# Patient Record
Sex: FEMALE | Race: BLACK OR AFRICAN AMERICAN | NOT HISPANIC OR LATINO
[De-identification: names, ages, dates, MRNs, and addresses within clinical notes are randomized per-mention and may not be internally consistent; named-entity substitution may affect disease eponyms.]

---

## 2022-11-19 ENCOUNTER — NON-APPOINTMENT (OUTPATIENT)
Age: 62
End: 2022-11-19

## 2023-11-22 RX ORDER — AMOXICILLIN 250 MG/5ML
1 SUSPENSION, RECONSTITUTED, ORAL (ML) ORAL
Refills: 0 | DISCHARGE
Start: 2023-11-22

## 2023-11-26 ENCOUNTER — INPATIENT (INPATIENT)
Facility: HOSPITAL | Age: 63
LOS: 2 days | Discharge: ANOTHER HOSPITAL | DRG: 303 | End: 2023-11-29
Attending: HOSPITALIST | Admitting: STUDENT IN AN ORGANIZED HEALTH CARE EDUCATION/TRAINING PROGRAM
Payer: COMMERCIAL

## 2023-11-26 VITALS — HEIGHT: 66 IN | WEIGHT: 192.02 LBS

## 2023-11-26 DIAGNOSIS — R07.9 CHEST PAIN, UNSPECIFIED: ICD-10-CM

## 2023-11-26 DIAGNOSIS — D64.9 ANEMIA, UNSPECIFIED: ICD-10-CM

## 2023-11-26 DIAGNOSIS — I10 ESSENTIAL (PRIMARY) HYPERTENSION: ICD-10-CM

## 2023-11-26 DIAGNOSIS — E11.9 TYPE 2 DIABETES MELLITUS WITHOUT COMPLICATIONS: ICD-10-CM

## 2023-11-26 LAB
ALBUMIN SERPL ELPH-MCNC: 3.7 G/DL — SIGNIFICANT CHANGE UP (ref 3.3–5)
ALBUMIN SERPL ELPH-MCNC: 3.7 G/DL — SIGNIFICANT CHANGE UP (ref 3.3–5)
ALP SERPL-CCNC: 83 U/L — SIGNIFICANT CHANGE UP (ref 40–120)
ALP SERPL-CCNC: 83 U/L — SIGNIFICANT CHANGE UP (ref 40–120)
ALT FLD-CCNC: 29 U/L — SIGNIFICANT CHANGE UP (ref 12–78)
ALT FLD-CCNC: 29 U/L — SIGNIFICANT CHANGE UP (ref 12–78)
ANION GAP SERPL CALC-SCNC: 8 MMOL/L — SIGNIFICANT CHANGE UP (ref 5–17)
ANION GAP SERPL CALC-SCNC: 8 MMOL/L — SIGNIFICANT CHANGE UP (ref 5–17)
ANISOCYTOSIS BLD QL: SIGNIFICANT CHANGE UP
ANISOCYTOSIS BLD QL: SIGNIFICANT CHANGE UP
APTT BLD: 29.2 SEC — SIGNIFICANT CHANGE UP (ref 24.5–35.6)
APTT BLD: 29.2 SEC — SIGNIFICANT CHANGE UP (ref 24.5–35.6)
AST SERPL-CCNC: 28 U/L — SIGNIFICANT CHANGE UP (ref 15–37)
AST SERPL-CCNC: 28 U/L — SIGNIFICANT CHANGE UP (ref 15–37)
BASOPHILS # BLD AUTO: 0.06 K/UL — SIGNIFICANT CHANGE UP (ref 0–0.2)
BASOPHILS # BLD AUTO: 0.06 K/UL — SIGNIFICANT CHANGE UP (ref 0–0.2)
BASOPHILS NFR BLD AUTO: 0.6 % — SIGNIFICANT CHANGE UP (ref 0–2)
BASOPHILS NFR BLD AUTO: 0.6 % — SIGNIFICANT CHANGE UP (ref 0–2)
BILIRUB SERPL-MCNC: 0.2 MG/DL — SIGNIFICANT CHANGE UP (ref 0.2–1.2)
BILIRUB SERPL-MCNC: 0.2 MG/DL — SIGNIFICANT CHANGE UP (ref 0.2–1.2)
BUN SERPL-MCNC: 13 MG/DL — SIGNIFICANT CHANGE UP (ref 7–23)
BUN SERPL-MCNC: 13 MG/DL — SIGNIFICANT CHANGE UP (ref 7–23)
CALCIUM SERPL-MCNC: 9.2 MG/DL — SIGNIFICANT CHANGE UP (ref 8.5–10.1)
CALCIUM SERPL-MCNC: 9.2 MG/DL — SIGNIFICANT CHANGE UP (ref 8.5–10.1)
CHLORIDE SERPL-SCNC: 112 MMOL/L — HIGH (ref 96–108)
CHLORIDE SERPL-SCNC: 112 MMOL/L — HIGH (ref 96–108)
CO2 SERPL-SCNC: 22 MMOL/L — SIGNIFICANT CHANGE UP (ref 22–31)
CO2 SERPL-SCNC: 22 MMOL/L — SIGNIFICANT CHANGE UP (ref 22–31)
CREAT SERPL-MCNC: 0.89 MG/DL — SIGNIFICANT CHANGE UP (ref 0.5–1.3)
CREAT SERPL-MCNC: 0.89 MG/DL — SIGNIFICANT CHANGE UP (ref 0.5–1.3)
DACRYOCYTES BLD QL SMEAR: SLIGHT — SIGNIFICANT CHANGE UP
DACRYOCYTES BLD QL SMEAR: SLIGHT — SIGNIFICANT CHANGE UP
EGFR: 72 ML/MIN/1.73M2 — SIGNIFICANT CHANGE UP
EGFR: 72 ML/MIN/1.73M2 — SIGNIFICANT CHANGE UP
ELLIPTOCYTES BLD QL SMEAR: SLIGHT — SIGNIFICANT CHANGE UP
ELLIPTOCYTES BLD QL SMEAR: SLIGHT — SIGNIFICANT CHANGE UP
EOSINOPHIL # BLD AUTO: 0.19 K/UL — SIGNIFICANT CHANGE UP (ref 0–0.5)
EOSINOPHIL # BLD AUTO: 0.19 K/UL — SIGNIFICANT CHANGE UP (ref 0–0.5)
EOSINOPHIL NFR BLD AUTO: 2 % — SIGNIFICANT CHANGE UP (ref 0–6)
EOSINOPHIL NFR BLD AUTO: 2 % — SIGNIFICANT CHANGE UP (ref 0–6)
GLUCOSE BLDC GLUCOMTR-MCNC: 117 MG/DL — HIGH (ref 70–99)
GLUCOSE BLDC GLUCOMTR-MCNC: 117 MG/DL — HIGH (ref 70–99)
GLUCOSE SERPL-MCNC: 90 MG/DL — SIGNIFICANT CHANGE UP (ref 70–99)
GLUCOSE SERPL-MCNC: 90 MG/DL — SIGNIFICANT CHANGE UP (ref 70–99)
HCT VFR BLD CALC: 29.9 % — LOW (ref 34.5–45)
HCT VFR BLD CALC: 29.9 % — LOW (ref 34.5–45)
HGB BLD-MCNC: 9 G/DL — LOW (ref 11.5–15.5)
HGB BLD-MCNC: 9 G/DL — LOW (ref 11.5–15.5)
HYPOCHROMIA BLD QL: SIGNIFICANT CHANGE UP
HYPOCHROMIA BLD QL: SIGNIFICANT CHANGE UP
IMM GRANULOCYTES NFR BLD AUTO: 0.2 % — SIGNIFICANT CHANGE UP (ref 0–0.9)
IMM GRANULOCYTES NFR BLD AUTO: 0.2 % — SIGNIFICANT CHANGE UP (ref 0–0.9)
INR BLD: 1.02 RATIO — SIGNIFICANT CHANGE UP (ref 0.85–1.18)
INR BLD: 1.02 RATIO — SIGNIFICANT CHANGE UP (ref 0.85–1.18)
LYMPHOCYTES # BLD AUTO: 1.59 K/UL — SIGNIFICANT CHANGE UP (ref 1–3.3)
LYMPHOCYTES # BLD AUTO: 1.59 K/UL — SIGNIFICANT CHANGE UP (ref 1–3.3)
LYMPHOCYTES # BLD AUTO: 16.5 % — SIGNIFICANT CHANGE UP (ref 13–44)
LYMPHOCYTES # BLD AUTO: 16.5 % — SIGNIFICANT CHANGE UP (ref 13–44)
MACROCYTES BLD QL: SLIGHT — SIGNIFICANT CHANGE UP
MACROCYTES BLD QL: SLIGHT — SIGNIFICANT CHANGE UP
MANUAL SMEAR VERIFICATION: SIGNIFICANT CHANGE UP
MANUAL SMEAR VERIFICATION: SIGNIFICANT CHANGE UP
MCHC RBC-ENTMCNC: 22.2 PG — LOW (ref 27–34)
MCHC RBC-ENTMCNC: 22.2 PG — LOW (ref 27–34)
MCHC RBC-ENTMCNC: 30.1 GM/DL — LOW (ref 32–36)
MCHC RBC-ENTMCNC: 30.1 GM/DL — LOW (ref 32–36)
MCV RBC AUTO: 73.6 FL — LOW (ref 80–100)
MCV RBC AUTO: 73.6 FL — LOW (ref 80–100)
MICROCYTES BLD QL: SIGNIFICANT CHANGE UP
MICROCYTES BLD QL: SIGNIFICANT CHANGE UP
MONOCYTES # BLD AUTO: 0.9 K/UL — SIGNIFICANT CHANGE UP (ref 0–0.9)
MONOCYTES # BLD AUTO: 0.9 K/UL — SIGNIFICANT CHANGE UP (ref 0–0.9)
MONOCYTES NFR BLD AUTO: 9.3 % — SIGNIFICANT CHANGE UP (ref 2–14)
MONOCYTES NFR BLD AUTO: 9.3 % — SIGNIFICANT CHANGE UP (ref 2–14)
NEUTROPHILS # BLD AUTO: 6.9 K/UL — SIGNIFICANT CHANGE UP (ref 1.8–7.4)
NEUTROPHILS # BLD AUTO: 6.9 K/UL — SIGNIFICANT CHANGE UP (ref 1.8–7.4)
NEUTROPHILS NFR BLD AUTO: 71.4 % — SIGNIFICANT CHANGE UP (ref 43–77)
NEUTROPHILS NFR BLD AUTO: 71.4 % — SIGNIFICANT CHANGE UP (ref 43–77)
PLAT MORPH BLD: NORMAL — SIGNIFICANT CHANGE UP
PLAT MORPH BLD: NORMAL — SIGNIFICANT CHANGE UP
PLATELET # BLD AUTO: 334 K/UL — SIGNIFICANT CHANGE UP (ref 150–400)
PLATELET # BLD AUTO: 334 K/UL — SIGNIFICANT CHANGE UP (ref 150–400)
PLATELET COUNT - ESTIMATE: NORMAL — SIGNIFICANT CHANGE UP
PLATELET COUNT - ESTIMATE: NORMAL — SIGNIFICANT CHANGE UP
POIKILOCYTOSIS BLD QL AUTO: SLIGHT — SIGNIFICANT CHANGE UP
POIKILOCYTOSIS BLD QL AUTO: SLIGHT — SIGNIFICANT CHANGE UP
POTASSIUM SERPL-MCNC: 3.8 MMOL/L — SIGNIFICANT CHANGE UP (ref 3.5–5.3)
POTASSIUM SERPL-MCNC: 3.8 MMOL/L — SIGNIFICANT CHANGE UP (ref 3.5–5.3)
POTASSIUM SERPL-SCNC: 3.8 MMOL/L — SIGNIFICANT CHANGE UP (ref 3.5–5.3)
POTASSIUM SERPL-SCNC: 3.8 MMOL/L — SIGNIFICANT CHANGE UP (ref 3.5–5.3)
PROT SERPL-MCNC: 7.8 GM/DL — SIGNIFICANT CHANGE UP (ref 6–8.3)
PROT SERPL-MCNC: 7.8 GM/DL — SIGNIFICANT CHANGE UP (ref 6–8.3)
PROTHROM AB SERPL-ACNC: 11.5 SEC — SIGNIFICANT CHANGE UP (ref 9.5–13)
PROTHROM AB SERPL-ACNC: 11.5 SEC — SIGNIFICANT CHANGE UP (ref 9.5–13)
RBC # BLD: 4.06 M/UL — SIGNIFICANT CHANGE UP (ref 3.8–5.2)
RBC # BLD: 4.06 M/UL — SIGNIFICANT CHANGE UP (ref 3.8–5.2)
RBC # FLD: 17.2 % — HIGH (ref 10.3–14.5)
RBC # FLD: 17.2 % — HIGH (ref 10.3–14.5)
RBC BLD AUTO: ABNORMAL
RBC BLD AUTO: ABNORMAL
SODIUM SERPL-SCNC: 142 MMOL/L — SIGNIFICANT CHANGE UP (ref 135–145)
SODIUM SERPL-SCNC: 142 MMOL/L — SIGNIFICANT CHANGE UP (ref 135–145)
STOMATOCYTES BLD QL SMEAR: SLIGHT — SIGNIFICANT CHANGE UP
STOMATOCYTES BLD QL SMEAR: SLIGHT — SIGNIFICANT CHANGE UP
TROPONIN I, HIGH SENSITIVITY RESULT: 4.76 NG/L — SIGNIFICANT CHANGE UP
TROPONIN I, HIGH SENSITIVITY RESULT: 4.76 NG/L — SIGNIFICANT CHANGE UP
WBC # BLD: 9.66 K/UL — SIGNIFICANT CHANGE UP (ref 3.8–10.5)
WBC # BLD: 9.66 K/UL — SIGNIFICANT CHANGE UP (ref 3.8–10.5)
WBC # FLD AUTO: 9.66 K/UL — SIGNIFICANT CHANGE UP (ref 3.8–10.5)
WBC # FLD AUTO: 9.66 K/UL — SIGNIFICANT CHANGE UP (ref 3.8–10.5)

## 2023-11-26 PROCEDURE — 93005 ELECTROCARDIOGRAM TRACING: CPT

## 2023-11-26 PROCEDURE — 87635 SARS-COV-2 COVID-19 AMP PRB: CPT

## 2023-11-26 PROCEDURE — 85027 COMPLETE CBC AUTOMATED: CPT

## 2023-11-26 PROCEDURE — 80053 COMPREHEN METABOLIC PANEL: CPT

## 2023-11-26 PROCEDURE — C9113: CPT

## 2023-11-26 PROCEDURE — 82962 GLUCOSE BLOOD TEST: CPT

## 2023-11-26 PROCEDURE — 82272 OCCULT BLD FECES 1-3 TESTS: CPT

## 2023-11-26 PROCEDURE — 88312 SPECIAL STAINS GROUP 1: CPT

## 2023-11-26 PROCEDURE — 36415 COLL VENOUS BLD VENIPUNCTURE: CPT

## 2023-11-26 PROCEDURE — 85025 COMPLETE CBC W/AUTO DIFF WBC: CPT

## 2023-11-26 PROCEDURE — 99285 EMERGENCY DEPT VISIT HI MDM: CPT

## 2023-11-26 PROCEDURE — 82728 ASSAY OF FERRITIN: CPT

## 2023-11-26 PROCEDURE — 93010 ELECTROCARDIOGRAM REPORT: CPT

## 2023-11-26 PROCEDURE — 83540 ASSAY OF IRON: CPT

## 2023-11-26 PROCEDURE — 84484 ASSAY OF TROPONIN QUANT: CPT

## 2023-11-26 PROCEDURE — 82607 VITAMIN B-12: CPT

## 2023-11-26 PROCEDURE — 83036 HEMOGLOBIN GLYCOSYLATED A1C: CPT

## 2023-11-26 PROCEDURE — 83550 IRON BINDING TEST: CPT

## 2023-11-26 PROCEDURE — 80074 ACUTE HEPATITIS PANEL: CPT

## 2023-11-26 PROCEDURE — 71045 X-RAY EXAM CHEST 1 VIEW: CPT | Mod: 26

## 2023-11-26 PROCEDURE — 99223 1ST HOSP IP/OBS HIGH 75: CPT

## 2023-11-26 PROCEDURE — 80048 BASIC METABOLIC PNL TOTAL CA: CPT

## 2023-11-26 PROCEDURE — 82746 ASSAY OF FOLIC ACID SERUM: CPT

## 2023-11-26 PROCEDURE — 80061 LIPID PANEL: CPT

## 2023-11-26 PROCEDURE — 88305 TISSUE EXAM BY PATHOLOGIST: CPT

## 2023-11-26 RX ORDER — ATORVASTATIN CALCIUM 80 MG/1
1 TABLET, FILM COATED ORAL
Refills: 0 | DISCHARGE

## 2023-11-26 RX ORDER — LABETALOL HCL 100 MG
10 TABLET ORAL ONCE
Refills: 0 | Status: COMPLETED | OUTPATIENT
Start: 2023-11-26 | End: 2023-11-26

## 2023-11-26 RX ORDER — ATORVASTATIN CALCIUM 80 MG/1
40 TABLET, FILM COATED ORAL AT BEDTIME
Refills: 0 | Status: DISCONTINUED | OUTPATIENT
Start: 2023-11-26 | End: 2023-11-29

## 2023-11-26 RX ORDER — VALSARTAN 80 MG/1
80 TABLET ORAL DAILY
Refills: 0 | Status: DISCONTINUED | OUTPATIENT
Start: 2023-11-26 | End: 2023-11-29

## 2023-11-26 RX ORDER — ASPIRIN/CALCIUM CARB/MAGNESIUM 324 MG
81 TABLET ORAL DAILY
Refills: 0 | Status: DISCONTINUED | OUTPATIENT
Start: 2023-11-26 | End: 2023-11-29

## 2023-11-26 RX ORDER — PANTOPRAZOLE SODIUM 20 MG/1
40 TABLET, DELAYED RELEASE ORAL
Refills: 0 | Status: DISCONTINUED | OUTPATIENT
Start: 2023-11-26 | End: 2023-11-29

## 2023-11-26 RX ORDER — DEXTROSE 50 % IN WATER 50 %
25 SYRINGE (ML) INTRAVENOUS ONCE
Refills: 0 | Status: DISCONTINUED | OUTPATIENT
Start: 2023-11-26 | End: 2023-11-29

## 2023-11-26 RX ORDER — METOPROLOL TARTRATE 50 MG
1 TABLET ORAL
Refills: 0 | DISCHARGE

## 2023-11-26 RX ORDER — ASPIRIN/CALCIUM CARB/MAGNESIUM 324 MG
324 TABLET ORAL ONCE
Refills: 0 | Status: COMPLETED | OUTPATIENT
Start: 2023-11-26 | End: 2023-11-26

## 2023-11-26 RX ORDER — LANOLIN ALCOHOL/MO/W.PET/CERES
3 CREAM (GRAM) TOPICAL AT BEDTIME
Refills: 0 | Status: DISCONTINUED | OUTPATIENT
Start: 2023-11-26 | End: 2023-11-29

## 2023-11-26 RX ORDER — VALSARTAN 80 MG/1
1 TABLET ORAL
Refills: 0 | DISCHARGE

## 2023-11-26 RX ORDER — AMOXICILLIN 250 MG/5ML
875 SUSPENSION, RECONSTITUTED, ORAL (ML) ORAL
Refills: 0 | Status: DISCONTINUED | OUTPATIENT
Start: 2023-11-26 | End: 2023-11-29

## 2023-11-26 RX ORDER — SODIUM CHLORIDE 9 MG/ML
1000 INJECTION, SOLUTION INTRAVENOUS
Refills: 0 | Status: DISCONTINUED | OUTPATIENT
Start: 2023-11-26 | End: 2023-11-29

## 2023-11-26 RX ORDER — ONDANSETRON 8 MG/1
4 TABLET, FILM COATED ORAL EVERY 8 HOURS
Refills: 0 | Status: DISCONTINUED | OUTPATIENT
Start: 2023-11-26 | End: 2023-11-29

## 2023-11-26 RX ORDER — INSULIN LISPRO 100/ML
VIAL (ML) SUBCUTANEOUS
Refills: 0 | Status: DISCONTINUED | OUTPATIENT
Start: 2023-11-26 | End: 2023-11-29

## 2023-11-26 RX ORDER — METOPROLOL TARTRATE 50 MG
100 TABLET ORAL DAILY
Refills: 0 | Status: DISCONTINUED | OUTPATIENT
Start: 2023-11-26 | End: 2023-11-29

## 2023-11-26 RX ORDER — ACETAMINOPHEN 500 MG
650 TABLET ORAL EVERY 6 HOURS
Refills: 0 | Status: DISCONTINUED | OUTPATIENT
Start: 2023-11-26 | End: 2023-11-29

## 2023-11-26 RX ORDER — DEXTROSE 50 % IN WATER 50 %
15 SYRINGE (ML) INTRAVENOUS ONCE
Refills: 0 | Status: DISCONTINUED | OUTPATIENT
Start: 2023-11-26 | End: 2023-11-29

## 2023-11-26 RX ORDER — LINAGLIPTIN AND METFORMIN HYDROCHLORIDE 2.5; 85 MG/1; MG/1
1 TABLET, FILM COATED ORAL
Refills: 0 | DISCHARGE

## 2023-11-26 RX ORDER — GLUCAGON INJECTION, SOLUTION 0.5 MG/.1ML
1 INJECTION, SOLUTION SUBCUTANEOUS ONCE
Refills: 0 | Status: DISCONTINUED | OUTPATIENT
Start: 2023-11-26 | End: 2023-11-29

## 2023-11-26 RX ORDER — CHOLECALCIFEROL (VITAMIN D3) 125 MCG
1 CAPSULE ORAL
Refills: 0 | DISCHARGE

## 2023-11-26 RX ADMIN — Medication 324 MILLIGRAM(S): at 12:41

## 2023-11-26 RX ADMIN — Medication 875 MILLIGRAM(S): at 19:51

## 2023-11-26 RX ADMIN — Medication 10 MILLIGRAM(S): at 18:58

## 2023-11-26 NOTE — ED PROVIDER NOTE - CLINICAL SUMMARY MEDICAL DECISION MAKING FREE TEXT BOX
ddx includes: ACS vs anemia vs electrolyte abnormality. will get ED CP labs, and occult. anticipate admission for ACS. ddx includes: ACS vs anemia vs electrolyte abnormality.    will get ED CP labs, and occult. anticipate admission for ACS.

## 2023-11-26 NOTE — PATIENT PROFILE ADULT - NSPROMEDSADMININFO_GEN_A_NUR
Patient called and stated she has been having shortness of breath, weight gain and leg swelling. She saw her dermatologist today and was advised to see Cardiology. Office visit scheduled on 09/26/2019.   no concerns

## 2023-11-26 NOTE — ED PROVIDER NOTE - OBJECTIVE STATEMENT
62 y/o female w/PMHx of HTN, HLD, DM type 2, sciatica, and anemia presents to the ED c/o chest pain that started this morning while decorating her home. Pt visited her cardiologist Dr Lim who noticed "blockage to the left side of heart" on outpt CT scan. States that chest pain is associated with neck pressure. Denies any pain in the arm. denies any bleeding. Pain today is different from the abd pain she usually experiences. Did not take any ASA today.

## 2023-11-26 NOTE — H&P ADULT - ASSESSMENT
63 year old female with a PMHx of HTN, HLD, DM2, sciatica, and anemia presents to the ED for chest pain. Endorses she has been having intermittent chest pain for the past 2 weeks, but this morning pain persisted, radiated to her neck (pressure like) and was more severe which prompted ED visit. Patient was seen by her cardiologist (Dr. Lim), who noticed "blockage to the left side of the heart" on CT scan done as outpatient. Upon evaluation, patient was found to be hypertensive -200s, Endorses pain has improved drastically, denies palpitation SOB, headache, dizziness or any other acute symptoms. In the ED labs remarkable for H/H 9.0/29.9. Troponin (-), ECG : Sinus tachy. No ST changes. Cardio consulted in the ED. Due to Blockage seen in the CT, patient will be taken to the Cath lab,

## 2023-11-26 NOTE — H&P ADULT - NSHPPHYSICALEXAM_GEN_ALL_CORE
GENERAL: NAD, comfortable at bedside   HEAD:  Atraumatic, Normocephalic  EYES: EOMI, PERRL, conjunctiva and sclera clear  NECK: Supple, No JVD  CHEST/LUNG: Clear to auscultation bilaterally; No wheezes, rales or rhonchi  HEART: Regular rate and rhythm; No murmurs, rubs, or gallops, (+)S1, S2  ABDOMEN: Soft, Nontender, Nondistended; Normal Bowel sounds   EXTREMITIES:  2+ Peripheral Pulses, No clubbing, cyanosis, or edema  PSYCH: normal mood and affect  NEUROLOGY: AAOx3, non-focal  SKIN: No rashes or lesions

## 2023-11-26 NOTE — ED ADULT NURSE REASSESSMENT NOTE - NS ED NURSE REASSESS COMMENT FT1
Pt received A&Ox4 VSS-BP improved. Pt denies chest pain/sob and is resting comfortably in room with family at bedside. Pt remains NPO-for possible procedure today? vs tomorrow. Plan of care reviewed, orders pending. Bed assignment pending. Call bell in reach.

## 2023-11-26 NOTE — H&P ADULT - NSHPLABSRESULTS_GEN_ALL_CORE
9.0    9.66  )-----------( 334      ( 26 Nov 2023 12:10 )             29.9     142  |  112<H>  |  13  ----------------------------<  90     11-26  3.8   |  22  |  0.89    Ca    9.2      26 Nov 2023 12:10    TPro  7.8  /  Alb  3.7  /  TBili  0.2  /  DBili  x   /  AST  28  /  ALT  29  /  AlkPhos  83  11-26    PT/INR: 11.5/1.02 (11-26-23 @ 12:10)  PTT: 29.2 (11-26-23 @ 12:10)

## 2023-11-26 NOTE — ED PROVIDER NOTE - ATTENDING APP SHARED VISIT CONTRIBUTION OF CARE
Dr. Paez: I personally saw the patient with the ACP and performed a substantive portion of the visit. I performed a face to face bedside interview with patient regarding history of present illness, review of symptoms and past medical history. I completed an independent physical exam and all aspects of medical decision making. I have discussed patient's plan of care with ACP. I agree with note as stated above, having amended the EMR as needed to reflect my findings. This includes HISTORY OF PRESENT ILLNESS, HIV, PAST MEDICAL/SURGICAL/FAMILY/SOCIAL HISTORY, ALLERGIES AND HOME MEDICATIONS, REVIEW OF SYSTEMS, PHYSICAL EXAM, MEDICAL DECISION MAKING and any PROGRESS NOTES during the time I functioned as the attending physician for this patient.

## 2023-11-26 NOTE — H&P ADULT - PROBLEM SELECTOR PLAN 1
2 weeks history of intermittent chest pain, worsen today   Outpatient CT from 11/22/23: There is CT evidence of coronary atherosclerosis with severe (>70%) stenosis of the proximal LAD due to calcified plaque. There is borderline severe stenosis (50-70%) of a large sized ramus due to calcified plaque.  Troponin (-)   - Tele   - ASA   - Statin   - NPO  - PPI   - DVT prophylaxis  - Cardio consulted Dr. Palla --> to be taken to the Cath Lab

## 2023-11-26 NOTE — ED ADULT TRIAGE NOTE - CHIEF COMPLAINT QUOTE
pt presents to ED c/o chest pain that started this morning while decorating her house. pt reports seeing cardiologist and having test last week which showed "blockage to left side of heart". endorses SOB with walking. EKG in progress

## 2023-11-26 NOTE — ED ADULT NURSE REASSESSMENT NOTE - NS ED NURSE REASSESS COMMENT FT1
RN spoke with MD Palla concerning possible cath tonight vs tomorrow-per Dr. Palla plan is for tomorrow. Pt given food and water and hospitalist made aware. Pt is ready for transfer to floor. Bed assignment pending.

## 2023-11-26 NOTE — H&P ADULT - PROBLEM SELECTOR PLAN 2
Hypertensive in the ED - -200s   Received Labetalol 10mg IV  Continue home meds   - Valsartan 80mg   - Metoprolol Succ 100mg   - Monitor BP

## 2023-11-27 LAB
A1C WITH ESTIMATED AVERAGE GLUCOSE RESULT: 6.7 % — HIGH (ref 4–5.6)
A1C WITH ESTIMATED AVERAGE GLUCOSE RESULT: 6.7 % — HIGH (ref 4–5.6)
ALBUMIN SERPL ELPH-MCNC: 3.3 G/DL — SIGNIFICANT CHANGE UP (ref 3.3–5)
ALBUMIN SERPL ELPH-MCNC: 3.3 G/DL — SIGNIFICANT CHANGE UP (ref 3.3–5)
ALP SERPL-CCNC: 61 U/L — SIGNIFICANT CHANGE UP (ref 40–120)
ALP SERPL-CCNC: 61 U/L — SIGNIFICANT CHANGE UP (ref 40–120)
ALT FLD-CCNC: 26 U/L — SIGNIFICANT CHANGE UP (ref 12–78)
ALT FLD-CCNC: 26 U/L — SIGNIFICANT CHANGE UP (ref 12–78)
ANION GAP SERPL CALC-SCNC: 8 MMOL/L — SIGNIFICANT CHANGE UP (ref 5–17)
ANION GAP SERPL CALC-SCNC: 8 MMOL/L — SIGNIFICANT CHANGE UP (ref 5–17)
AST SERPL-CCNC: 22 U/L — SIGNIFICANT CHANGE UP (ref 15–37)
AST SERPL-CCNC: 22 U/L — SIGNIFICANT CHANGE UP (ref 15–37)
BILIRUB SERPL-MCNC: 0.3 MG/DL — SIGNIFICANT CHANGE UP (ref 0.2–1.2)
BILIRUB SERPL-MCNC: 0.3 MG/DL — SIGNIFICANT CHANGE UP (ref 0.2–1.2)
BUN SERPL-MCNC: 15 MG/DL — SIGNIFICANT CHANGE UP (ref 7–23)
BUN SERPL-MCNC: 15 MG/DL — SIGNIFICANT CHANGE UP (ref 7–23)
CALCIUM SERPL-MCNC: 8.6 MG/DL — SIGNIFICANT CHANGE UP (ref 8.5–10.1)
CALCIUM SERPL-MCNC: 8.6 MG/DL — SIGNIFICANT CHANGE UP (ref 8.5–10.1)
CHLORIDE SERPL-SCNC: 112 MMOL/L — HIGH (ref 96–108)
CHLORIDE SERPL-SCNC: 112 MMOL/L — HIGH (ref 96–108)
CHOLEST SERPL-MCNC: 130 MG/DL — SIGNIFICANT CHANGE UP
CHOLEST SERPL-MCNC: 130 MG/DL — SIGNIFICANT CHANGE UP
CO2 SERPL-SCNC: 23 MMOL/L — SIGNIFICANT CHANGE UP (ref 22–31)
CO2 SERPL-SCNC: 23 MMOL/L — SIGNIFICANT CHANGE UP (ref 22–31)
CREAT SERPL-MCNC: 0.78 MG/DL — SIGNIFICANT CHANGE UP (ref 0.5–1.3)
CREAT SERPL-MCNC: 0.78 MG/DL — SIGNIFICANT CHANGE UP (ref 0.5–1.3)
EGFR: 85 ML/MIN/1.73M2 — SIGNIFICANT CHANGE UP
EGFR: 85 ML/MIN/1.73M2 — SIGNIFICANT CHANGE UP
ESTIMATED AVERAGE GLUCOSE: 146 MG/DL — HIGH (ref 68–114)
ESTIMATED AVERAGE GLUCOSE: 146 MG/DL — HIGH (ref 68–114)
FERRITIN SERPL-MCNC: 8 NG/ML — LOW (ref 13–330)
FERRITIN SERPL-MCNC: 8 NG/ML — LOW (ref 13–330)
GLUCOSE BLDC GLUCOMTR-MCNC: 133 MG/DL — HIGH (ref 70–99)
GLUCOSE BLDC GLUCOMTR-MCNC: 133 MG/DL — HIGH (ref 70–99)
GLUCOSE BLDC GLUCOMTR-MCNC: 141 MG/DL — HIGH (ref 70–99)
GLUCOSE BLDC GLUCOMTR-MCNC: 141 MG/DL — HIGH (ref 70–99)
GLUCOSE BLDC GLUCOMTR-MCNC: 173 MG/DL — HIGH (ref 70–99)
GLUCOSE BLDC GLUCOMTR-MCNC: 173 MG/DL — HIGH (ref 70–99)
GLUCOSE SERPL-MCNC: 130 MG/DL — HIGH (ref 70–99)
GLUCOSE SERPL-MCNC: 130 MG/DL — HIGH (ref 70–99)
HAV IGM SER-ACNC: SIGNIFICANT CHANGE UP
HAV IGM SER-ACNC: SIGNIFICANT CHANGE UP
HBV CORE IGM SER-ACNC: SIGNIFICANT CHANGE UP
HBV CORE IGM SER-ACNC: SIGNIFICANT CHANGE UP
HBV SURFACE AG SER-ACNC: SIGNIFICANT CHANGE UP
HBV SURFACE AG SER-ACNC: SIGNIFICANT CHANGE UP
HCT VFR BLD CALC: 25.7 % — LOW (ref 34.5–45)
HCT VFR BLD CALC: 25.7 % — LOW (ref 34.5–45)
HCT VFR BLD CALC: 27.1 % — LOW (ref 34.5–45)
HCT VFR BLD CALC: 27.1 % — LOW (ref 34.5–45)
HCV AB S/CO SERPL IA: 0.07 S/CO — SIGNIFICANT CHANGE UP (ref 0–0.99)
HCV AB S/CO SERPL IA: 0.07 S/CO — SIGNIFICANT CHANGE UP (ref 0–0.99)
HCV AB SERPL-IMP: SIGNIFICANT CHANGE UP
HCV AB SERPL-IMP: SIGNIFICANT CHANGE UP
HDLC SERPL-MCNC: 38 MG/DL — LOW
HDLC SERPL-MCNC: 38 MG/DL — LOW
HGB BLD-MCNC: 7.8 G/DL — LOW (ref 11.5–15.5)
HGB BLD-MCNC: 7.8 G/DL — LOW (ref 11.5–15.5)
HGB BLD-MCNC: 8.1 G/DL — LOW (ref 11.5–15.5)
HGB BLD-MCNC: 8.1 G/DL — LOW (ref 11.5–15.5)
IRON SATN MFR SERPL: 17 UG/DL — LOW (ref 30–160)
IRON SATN MFR SERPL: 17 UG/DL — LOW (ref 30–160)
IRON SATN MFR SERPL: 5 % — LOW (ref 14–50)
IRON SATN MFR SERPL: 5 % — LOW (ref 14–50)
LIPID PNL WITH DIRECT LDL SERPL: 71 MG/DL — SIGNIFICANT CHANGE UP
LIPID PNL WITH DIRECT LDL SERPL: 71 MG/DL — SIGNIFICANT CHANGE UP
MCHC RBC-ENTMCNC: 21.8 PG — LOW (ref 27–34)
MCHC RBC-ENTMCNC: 21.8 PG — LOW (ref 27–34)
MCHC RBC-ENTMCNC: 22.3 PG — LOW (ref 27–34)
MCHC RBC-ENTMCNC: 22.3 PG — LOW (ref 27–34)
MCHC RBC-ENTMCNC: 29.9 GM/DL — LOW (ref 32–36)
MCHC RBC-ENTMCNC: 29.9 GM/DL — LOW (ref 32–36)
MCHC RBC-ENTMCNC: 30.4 GM/DL — LOW (ref 32–36)
MCHC RBC-ENTMCNC: 30.4 GM/DL — LOW (ref 32–36)
MCV RBC AUTO: 73 FL — LOW (ref 80–100)
MCV RBC AUTO: 73 FL — LOW (ref 80–100)
MCV RBC AUTO: 73.4 FL — LOW (ref 80–100)
MCV RBC AUTO: 73.4 FL — LOW (ref 80–100)
NON HDL CHOLESTEROL: 92 MG/DL — SIGNIFICANT CHANGE UP
NON HDL CHOLESTEROL: 92 MG/DL — SIGNIFICANT CHANGE UP
OB PNL STL: NEGATIVE — SIGNIFICANT CHANGE UP
OB PNL STL: NEGATIVE — SIGNIFICANT CHANGE UP
PLATELET # BLD AUTO: 292 K/UL — SIGNIFICANT CHANGE UP (ref 150–400)
PLATELET # BLD AUTO: 292 K/UL — SIGNIFICANT CHANGE UP (ref 150–400)
PLATELET # BLD AUTO: 309 K/UL — SIGNIFICANT CHANGE UP (ref 150–400)
PLATELET # BLD AUTO: 309 K/UL — SIGNIFICANT CHANGE UP (ref 150–400)
POTASSIUM SERPL-MCNC: 4 MMOL/L — SIGNIFICANT CHANGE UP (ref 3.5–5.3)
POTASSIUM SERPL-MCNC: 4 MMOL/L — SIGNIFICANT CHANGE UP (ref 3.5–5.3)
POTASSIUM SERPL-SCNC: 4 MMOL/L — SIGNIFICANT CHANGE UP (ref 3.5–5.3)
POTASSIUM SERPL-SCNC: 4 MMOL/L — SIGNIFICANT CHANGE UP (ref 3.5–5.3)
PROT SERPL-MCNC: 7 GM/DL — SIGNIFICANT CHANGE UP (ref 6–8.3)
PROT SERPL-MCNC: 7 GM/DL — SIGNIFICANT CHANGE UP (ref 6–8.3)
RBC # BLD: 3.5 M/UL — LOW (ref 3.8–5.2)
RBC # BLD: 3.5 M/UL — LOW (ref 3.8–5.2)
RBC # BLD: 3.71 M/UL — LOW (ref 3.8–5.2)
RBC # BLD: 3.71 M/UL — LOW (ref 3.8–5.2)
RBC # FLD: 17 % — HIGH (ref 10.3–14.5)
RBC # FLD: 17 % — HIGH (ref 10.3–14.5)
RBC # FLD: 17.2 % — HIGH (ref 10.3–14.5)
RBC # FLD: 17.2 % — HIGH (ref 10.3–14.5)
SODIUM SERPL-SCNC: 143 MMOL/L — SIGNIFICANT CHANGE UP (ref 135–145)
SODIUM SERPL-SCNC: 143 MMOL/L — SIGNIFICANT CHANGE UP (ref 135–145)
TIBC SERPL-MCNC: 341 UG/DL — SIGNIFICANT CHANGE UP (ref 220–430)
TIBC SERPL-MCNC: 341 UG/DL — SIGNIFICANT CHANGE UP (ref 220–430)
TRIGL SERPL-MCNC: 116 MG/DL — SIGNIFICANT CHANGE UP
TRIGL SERPL-MCNC: 116 MG/DL — SIGNIFICANT CHANGE UP
TROPONIN I, HIGH SENSITIVITY RESULT: 3.92 NG/L — SIGNIFICANT CHANGE UP
TROPONIN I, HIGH SENSITIVITY RESULT: 3.92 NG/L — SIGNIFICANT CHANGE UP
UIBC SERPL-MCNC: 324 UG/DL — SIGNIFICANT CHANGE UP (ref 110–370)
UIBC SERPL-MCNC: 324 UG/DL — SIGNIFICANT CHANGE UP (ref 110–370)
WBC # BLD: 7.66 K/UL — SIGNIFICANT CHANGE UP (ref 3.8–10.5)
WBC # BLD: 7.66 K/UL — SIGNIFICANT CHANGE UP (ref 3.8–10.5)
WBC # BLD: 8.01 K/UL — SIGNIFICANT CHANGE UP (ref 3.8–10.5)
WBC # BLD: 8.01 K/UL — SIGNIFICANT CHANGE UP (ref 3.8–10.5)
WBC # FLD AUTO: 7.66 K/UL — SIGNIFICANT CHANGE UP (ref 3.8–10.5)
WBC # FLD AUTO: 7.66 K/UL — SIGNIFICANT CHANGE UP (ref 3.8–10.5)
WBC # FLD AUTO: 8.01 K/UL — SIGNIFICANT CHANGE UP (ref 3.8–10.5)
WBC # FLD AUTO: 8.01 K/UL — SIGNIFICANT CHANGE UP (ref 3.8–10.5)

## 2023-11-27 PROCEDURE — 93010 ELECTROCARDIOGRAM REPORT: CPT

## 2023-11-27 PROCEDURE — 99232 SBSQ HOSP IP/OBS MODERATE 35: CPT

## 2023-11-27 RX ORDER — IRON SUCROSE 20 MG/ML
200 INJECTION, SOLUTION INTRAVENOUS ONCE
Refills: 0 | Status: COMPLETED | OUTPATIENT
Start: 2023-11-27 | End: 2023-11-27

## 2023-11-27 RX ORDER — FOLIC ACID 0.8 MG
1 TABLET ORAL ONCE
Refills: 0 | Status: COMPLETED | OUTPATIENT
Start: 2023-11-27 | End: 2023-11-27

## 2023-11-27 RX ORDER — PREGABALIN 225 MG/1
1000 CAPSULE ORAL ONCE
Refills: 0 | Status: COMPLETED | OUTPATIENT
Start: 2023-11-27 | End: 2023-11-27

## 2023-11-27 RX ORDER — IRON SUCROSE 20 MG/ML
200 INJECTION, SOLUTION INTRAVENOUS ONCE
Refills: 0 | Status: DISCONTINUED | OUTPATIENT
Start: 2023-11-27 | End: 2023-11-27

## 2023-11-27 RX ADMIN — Medication 100 MILLIGRAM(S): at 11:12

## 2023-11-27 RX ADMIN — PREGABALIN 1000 MICROGRAM(S): 225 CAPSULE ORAL at 16:41

## 2023-11-27 RX ADMIN — Medication 81 MILLIGRAM(S): at 11:13

## 2023-11-27 RX ADMIN — Medication 875 MILLIGRAM(S): at 11:13

## 2023-11-27 RX ADMIN — Medication 2: at 17:10

## 2023-11-27 RX ADMIN — Medication 1 MILLIGRAM(S): at 16:41

## 2023-11-27 RX ADMIN — IRON SUCROSE 110 MILLIGRAM(S): 20 INJECTION, SOLUTION INTRAVENOUS at 16:40

## 2023-11-27 RX ADMIN — ATORVASTATIN CALCIUM 40 MILLIGRAM(S): 80 TABLET, FILM COATED ORAL at 22:24

## 2023-11-27 RX ADMIN — PANTOPRAZOLE SODIUM 40 MILLIGRAM(S): 20 TABLET, DELAYED RELEASE ORAL at 08:29

## 2023-11-27 RX ADMIN — Medication 3 MILLIGRAM(S): at 22:24

## 2023-11-27 RX ADMIN — VALSARTAN 80 MILLIGRAM(S): 80 TABLET ORAL at 14:30

## 2023-11-27 RX ADMIN — Medication 650 MILLIGRAM(S): at 16:05

## 2023-11-27 RX ADMIN — Medication 875 MILLIGRAM(S): at 22:24

## 2023-11-27 NOTE — CONSULT NOTE ADULT - SUBJECTIVE AND OBJECTIVE BOX
62 yo male with c/o chest pain, angina..  Recent out pt CCTA showed > 70% proximal LAD calcified stenosis.   Pain was severe yesterday, radiated to neck.  Had severely elevated BP on arrival to the ER.  Pain improved with BP control.   Also noted to have severe anemia recently and had GI evaluation with pending endoscopies.       PAST MEDICAL & SURGICAL HISTORY:  Hypertension  Diabetes mellitus  Hyperlipidemia  Anemia - recently diagnosed.  Iron deficiency      PREVIOUS CARDIAC WORKUP:      Echo:  11/9/23  --LV ejection fraction (60 %) is normal.  --The left ventricular filling pattern is consistent with abnormal relaxation.  --The global LV longitudinal strain using the speckle tracking technology is -18.4 %.  --There is trace mitral regurgitation.  --There is mild tricuspid regurgitation.  --There is trace pulmonic regurgitation.  --The right atrial pressure is normal (0 - 5 mm Hg). There is no pulmonary hypertension.  --There is no pericardial effusion.      ALLERGIES:    No Known Allergies       MEDICATIONS  (STANDING):  amoxicillin 875 milliGRAM(s) Oral two times a day  aspirin  chewable 81 milliGRAM(s) Oral daily  atorvastatin 40 milliGRAM(s) Oral at bedtime  dextrose 5%. 1000 milliLiter(s) (50 mL/Hr) IV Continuous <Continuous>  dextrose 50% Injectable 25 Gram(s) IV Push once  glucagon  Injectable 1 milliGRAM(s) IntraMuscular once  insulin lispro (ADMELOG) corrective regimen sliding scale   SubCutaneous three times a day before meals  metoprolol succinate  milliGRAM(s) Oral daily  pantoprazole  Injectable 40 milliGRAM(s) IV Push with breakfast  valsartan 80 milliGRAM(s) Oral daily    MEDICATIONS  (PRN):  acetaminophen     Tablet .. 650 milliGRAM(s) Oral every 6 hours PRN Temp greater or equal to 38C (100.4F), Mild Pain (1 - 3)  aluminum hydroxide/magnesium hydroxide/simethicone Suspension 30 milliLiter(s) Oral every 4 hours PRN Dyspepsia  dextrose Oral Gel 15 Gram(s) Oral once PRN Blood Glucose LESS THAN 70 milliGRAM(s)/deciliter  melatonin 3 milliGRAM(s) Oral at bedtime PRN Insomnia  ondansetron Injectable 4 milliGRAM(s) IV Push every 8 hours PRN Nausea and/or Vomiting      FAMILY HISTORY:  NC        SOCIAL HISTORY:  Nonsmoker. No ETOH abuse. No illicit drugs.     ROS:     Detailed ten system ROS was performed and was negative except for history as eluded to above.    no fever  no chills  no nausea  no vomiting  no diarrhea  no constipation  no melena  no hematochezia  + chest pain  no palpitations  no sob at rest  + dyspnea on exertion  no cough  no wheezing  no anorexia  no headache  no dizziness  no syncope  no weakness  no myalgia  no dysuria  no polyuria  no hematuria       Vital Signs Last 24 Hrs  T(C): 36.9 (27 Nov 2023 08:01), Max: 37.1 (26 Nov 2023 17:37)  T(F): 98.4 (27 Nov 2023 08:01), Max: 98.7 (26 Nov 2023 17:37)  HR: 89 (27 Nov 2023 08:01) (82 - 102)  BP: 141/77 (27 Nov 2023 08:01) (139/71 - 187/88)  BP(mean): 118 (26 Nov 2023 18:55) (94 - 118)  RR: 18 (27 Nov 2023 08:01) (18 - 19)  SpO2: 95% (27 Nov 2023 08:01) (95% - 100%)    Parameters below as of 27 Nov 2023 08:01  Patient On (Oxygen Delivery Method): room air      PHYSICAL EXAM:    General:                Comfortable, AAO X 3, in no distress.  HEENT:                  Unremarkable. Pallor   Neck:                     Supple, no adenopathy, no thyromegaly, no JVD, no bruit.  Chest:                    Clear, B/L symmetric air entry, no tachypnea  Heart:                     S1, S2 normal, no gallop, no murmur.  Abdomen:              Soft, non-tender, bowel sounds active. No palpable masses.  Extremities:           no cyanosis, no edema.   Neurologic:            Grossly nonfocal.       TELEMETRY:    Normal sinus rhythm with no tachy or digna events     ECG:  NSR, normal    LABS:                          7.8    8.01  )-----------( 292      ( 27 Nov 2023 06:15 )             25.7     Hemoglobin: 9.0 g/dL (11.26.23 @ 12:10)     11-27    143  |  112<H>  |  15  ----------------------------<  130<H>  4.0   |  23  |  0.78    Ca    8.6      27 Nov 2023 06:15    TPro  7.0  /  Alb  3.3  /  TBili  0.3  /  DBili  x   /  AST  22  /  ALT  26  /  AlkPhos  61  11-27 11-27 @ 06:15  Trop-I  3.92    11-26 @ 12:10  Trop-I  4.76      PT/INR - ( 26 Nov 2023 12:10 )   PT: 11.5 sec;   INR: 1.02 ratio    PTT - ( 26 Nov 2023 12:10 )  PTT:29.2 sec      RADIOLOGY & ADDITIONAL STUDIES:    < from: Xray Chest 1 View- PORTABLE-Urgent (11.26.23 @ 12:12) >  IMPRESSION:  No active parenchymal disease in the chest.

## 2023-11-27 NOTE — CONSULT NOTE ADULT - ASSESSMENT
CAD with Unstable angina.  In setting of uncontrolled HTN and severe anemia  DM  HLD    Suggest:    Medical optimization of CAD first   Optimize BP  Cardiac monitor  O2 supplement  Follow up Cardiac enzymes  Ensure no active GI bleed. Then treat with aspirin, Plavix  Maximize Beta blockers  Increase ARB dose if Bp remains high.   High dose statins  Nitrates PRN  Ischemia evaluation with cardiac cath once anemia etiology is clear and stabilized. Patient will need high dose anticoagulation during and after cardiac cath.  Will need clearance form Gi to start anticoagulation.   D/W Dr Cook hospitalist

## 2023-11-27 NOTE — PROGRESS NOTE ADULT - NSPROGADDITIONALINFOA_GEN_ALL_CORE
d/w cards  needs cath  at this time no active bleeding  patient consents to blood if needed however hb is 8  will give venofer, folate and b12   GI consulted  prbc on standby if hb drops tomorrow

## 2023-11-28 LAB
BASOPHILS # BLD AUTO: 0.06 K/UL — SIGNIFICANT CHANGE UP (ref 0–0.2)
BASOPHILS # BLD AUTO: 0.06 K/UL — SIGNIFICANT CHANGE UP (ref 0–0.2)
BASOPHILS NFR BLD AUTO: 0.8 % — SIGNIFICANT CHANGE UP (ref 0–2)
BASOPHILS NFR BLD AUTO: 0.8 % — SIGNIFICANT CHANGE UP (ref 0–2)
EOSINOPHIL # BLD AUTO: 0.34 K/UL — SIGNIFICANT CHANGE UP (ref 0–0.5)
EOSINOPHIL # BLD AUTO: 0.34 K/UL — SIGNIFICANT CHANGE UP (ref 0–0.5)
EOSINOPHIL NFR BLD AUTO: 4.7 % — SIGNIFICANT CHANGE UP (ref 0–6)
EOSINOPHIL NFR BLD AUTO: 4.7 % — SIGNIFICANT CHANGE UP (ref 0–6)
FOLATE SERPL-MCNC: 8.6 NG/ML — SIGNIFICANT CHANGE UP
FOLATE SERPL-MCNC: 8.6 NG/ML — SIGNIFICANT CHANGE UP
GLUCOSE BLDC GLUCOMTR-MCNC: 158 MG/DL — HIGH (ref 70–99)
GLUCOSE BLDC GLUCOMTR-MCNC: 158 MG/DL — HIGH (ref 70–99)
GLUCOSE BLDC GLUCOMTR-MCNC: 166 MG/DL — HIGH (ref 70–99)
GLUCOSE BLDC GLUCOMTR-MCNC: 166 MG/DL — HIGH (ref 70–99)
GLUCOSE BLDC GLUCOMTR-MCNC: 187 MG/DL — HIGH (ref 70–99)
GLUCOSE BLDC GLUCOMTR-MCNC: 187 MG/DL — HIGH (ref 70–99)
HCT VFR BLD CALC: 27.1 % — LOW (ref 34.5–45)
HCT VFR BLD CALC: 27.1 % — LOW (ref 34.5–45)
HGB BLD-MCNC: 8.1 G/DL — LOW (ref 11.5–15.5)
HGB BLD-MCNC: 8.1 G/DL — LOW (ref 11.5–15.5)
IMM GRANULOCYTES NFR BLD AUTO: 0.3 % — SIGNIFICANT CHANGE UP (ref 0–0.9)
IMM GRANULOCYTES NFR BLD AUTO: 0.3 % — SIGNIFICANT CHANGE UP (ref 0–0.9)
IRON SATN MFR SERPL: 340 UG/DL — HIGH (ref 30–160)
IRON SATN MFR SERPL: 340 UG/DL — HIGH (ref 30–160)
LYMPHOCYTES # BLD AUTO: 2.1 K/UL — SIGNIFICANT CHANGE UP (ref 1–3.3)
LYMPHOCYTES # BLD AUTO: 2.1 K/UL — SIGNIFICANT CHANGE UP (ref 1–3.3)
LYMPHOCYTES # BLD AUTO: 28.8 % — SIGNIFICANT CHANGE UP (ref 13–44)
LYMPHOCYTES # BLD AUTO: 28.8 % — SIGNIFICANT CHANGE UP (ref 13–44)
MCHC RBC-ENTMCNC: 22.1 PG — LOW (ref 27–34)
MCHC RBC-ENTMCNC: 22.1 PG — LOW (ref 27–34)
MCHC RBC-ENTMCNC: 29.9 GM/DL — LOW (ref 32–36)
MCHC RBC-ENTMCNC: 29.9 GM/DL — LOW (ref 32–36)
MCV RBC AUTO: 73.8 FL — LOW (ref 80–100)
MCV RBC AUTO: 73.8 FL — LOW (ref 80–100)
MONOCYTES # BLD AUTO: 0.76 K/UL — SIGNIFICANT CHANGE UP (ref 0–0.9)
MONOCYTES # BLD AUTO: 0.76 K/UL — SIGNIFICANT CHANGE UP (ref 0–0.9)
MONOCYTES NFR BLD AUTO: 10.4 % — SIGNIFICANT CHANGE UP (ref 2–14)
MONOCYTES NFR BLD AUTO: 10.4 % — SIGNIFICANT CHANGE UP (ref 2–14)
NEUTROPHILS # BLD AUTO: 4 K/UL — SIGNIFICANT CHANGE UP (ref 1.8–7.4)
NEUTROPHILS # BLD AUTO: 4 K/UL — SIGNIFICANT CHANGE UP (ref 1.8–7.4)
NEUTROPHILS NFR BLD AUTO: 55 % — SIGNIFICANT CHANGE UP (ref 43–77)
NEUTROPHILS NFR BLD AUTO: 55 % — SIGNIFICANT CHANGE UP (ref 43–77)
PLATELET # BLD AUTO: 298 K/UL — SIGNIFICANT CHANGE UP (ref 150–400)
PLATELET # BLD AUTO: 298 K/UL — SIGNIFICANT CHANGE UP (ref 150–400)
RBC # BLD: 3.67 M/UL — LOW (ref 3.8–5.2)
RBC # BLD: 3.67 M/UL — LOW (ref 3.8–5.2)
RBC # FLD: 17.2 % — HIGH (ref 10.3–14.5)
RBC # FLD: 17.2 % — HIGH (ref 10.3–14.5)
VIT B12 SERPL-MCNC: >2000 PG/ML — HIGH (ref 232–1245)
VIT B12 SERPL-MCNC: >2000 PG/ML — HIGH (ref 232–1245)
WBC # BLD: 7.28 K/UL — SIGNIFICANT CHANGE UP (ref 3.8–10.5)
WBC # BLD: 7.28 K/UL — SIGNIFICANT CHANGE UP (ref 3.8–10.5)
WBC # FLD AUTO: 7.28 K/UL — SIGNIFICANT CHANGE UP (ref 3.8–10.5)
WBC # FLD AUTO: 7.28 K/UL — SIGNIFICANT CHANGE UP (ref 3.8–10.5)

## 2023-11-28 PROCEDURE — 99222 1ST HOSP IP/OBS MODERATE 55: CPT

## 2023-11-28 PROCEDURE — 99232 SBSQ HOSP IP/OBS MODERATE 35: CPT

## 2023-11-28 RX ORDER — IRON SUCROSE 20 MG/ML
100 INJECTION, SOLUTION INTRAVENOUS ONCE
Refills: 0 | Status: COMPLETED | OUTPATIENT
Start: 2023-11-28 | End: 2023-11-28

## 2023-11-28 RX ORDER — PREGABALIN 225 MG/1
1000 CAPSULE ORAL ONCE
Refills: 0 | Status: COMPLETED | OUTPATIENT
Start: 2023-11-28 | End: 2023-11-28

## 2023-11-28 RX ORDER — IRON SUCROSE 20 MG/ML
100 INJECTION, SOLUTION INTRAVENOUS ONCE
Refills: 0 | Status: DISCONTINUED | OUTPATIENT
Start: 2023-11-28 | End: 2023-11-28

## 2023-11-28 RX ORDER — FOLIC ACID 0.8 MG
1 TABLET ORAL DAILY
Refills: 0 | Status: DISCONTINUED | OUTPATIENT
Start: 2023-11-28 | End: 2023-11-29

## 2023-11-28 RX ADMIN — Medication 81 MILLIGRAM(S): at 09:24

## 2023-11-28 RX ADMIN — IRON SUCROSE 210 MILLIGRAM(S): 20 INJECTION, SOLUTION INTRAVENOUS at 15:06

## 2023-11-28 RX ADMIN — Medication 875 MILLIGRAM(S): at 21:19

## 2023-11-28 RX ADMIN — Medication 875 MILLIGRAM(S): at 09:23

## 2023-11-28 RX ADMIN — VALSARTAN 80 MILLIGRAM(S): 80 TABLET ORAL at 09:24

## 2023-11-28 RX ADMIN — Medication 100 MILLIGRAM(S): at 09:24

## 2023-11-28 RX ADMIN — Medication 650 MILLIGRAM(S): at 11:58

## 2023-11-28 RX ADMIN — Medication 2: at 11:55

## 2023-11-28 RX ADMIN — Medication 1 MILLIGRAM(S): at 15:09

## 2023-11-28 RX ADMIN — Medication 2: at 08:15

## 2023-11-28 RX ADMIN — Medication 650 MILLIGRAM(S): at 22:15

## 2023-11-28 RX ADMIN — Medication 3 MILLIGRAM(S): at 21:19

## 2023-11-28 RX ADMIN — Medication 650 MILLIGRAM(S): at 21:19

## 2023-11-28 RX ADMIN — PANTOPRAZOLE SODIUM 40 MILLIGRAM(S): 20 TABLET, DELAYED RELEASE ORAL at 08:29

## 2023-11-28 RX ADMIN — Medication 2: at 17:13

## 2023-11-28 RX ADMIN — ATORVASTATIN CALCIUM 40 MILLIGRAM(S): 80 TABLET, FILM COATED ORAL at 21:19

## 2023-11-28 RX ADMIN — Medication 650 MILLIGRAM(S): at 13:00

## 2023-11-28 RX ADMIN — PREGABALIN 1000 MICROGRAM(S): 225 CAPSULE ORAL at 15:09

## 2023-11-28 NOTE — CONSULT NOTE ADULT - SUBJECTIVE AND OBJECTIVE BOX
Patient is a 63y old  Female who presents with a chief complaint of Chest Pain/Angina    HPI:  This is a 63 year old female with significant past medical history of HTN, HLD, DM2, sciatica, and anemia presents to the ED for chest pain. Cardiology- Dr. Lim/ Dr. Stevens evaluation revealing 70% stenosis proximal LAD with plan for inpatient angiogram with likely intervention. GI consulted for anemia and epigastric pain with optimization prior to likely necessity for DAPT. Per patient, has been having intermittent epigastric pain for which she has been taking OTC Prilosec/ TUMS without relief. Denies any overt signs of GIB including hematemesis, hematochezia, or melena. Last EGD/colon with Dr. Gonzáles 5 years ago, patient reports she was prescribed PPI for several months at that time and did not continue. Currently comfortable denies any abdominal or epigastric pain. Endorses hunger. Hgb 8.1.     PAST MEDICAL & SURGICAL HISTORY:  Hypertension    Diabetes mellitus    Hyperlipidemia    MEDICATIONS  (STANDING):  amoxicillin 875 milliGRAM(s) Oral two times a day  aspirin  chewable 81 milliGRAM(s) Oral daily  atorvastatin 40 milliGRAM(s) Oral at bedtime  dextrose 5%. 1000 milliLiter(s) (50 mL/Hr) IV Continuous <Continuous>  dextrose 50% Injectable 25 Gram(s) IV Push once  folic acid 1 milliGRAM(s) Oral daily  glucagon  Injectable 1 milliGRAM(s) IntraMuscular once  insulin lispro (ADMELOG) corrective regimen sliding scale   SubCutaneous three times a day before meals  metoprolol succinate  milliGRAM(s) Oral daily  pantoprazole  Injectable 40 milliGRAM(s) IV Push with breakfast  valsartan 80 milliGRAM(s) Oral daily    MEDICATIONS  (PRN):  acetaminophen     Tablet .. 650 milliGRAM(s) Oral every 6 hours PRN Temp greater or equal to 38C (100.4F), Mild Pain (1 - 3)  aluminum hydroxide/magnesium hydroxide/simethicone Suspension 30 milliLiter(s) Oral every 4 hours PRN Dyspepsia  dextrose Oral Gel 15 Gram(s) Oral once PRN Blood Glucose LESS THAN 70 milliGRAM(s)/deciliter  melatonin 3 milliGRAM(s) Oral at bedtime PRN Insomnia  ondansetron Injectable 4 milliGRAM(s) IV Push every 8 hours PRN Nausea and/or Vomiting      Allergies    No Known Allergies    Intolerances        SOCIAL HISTORY:    FAMILY HISTORY:      REVIEW OF SYSTEMS:    CONSTITUTIONAL: No weakness, fevers or chills  EYES/ENT: No visual changes;  No vertigo or throat pain   NECK: No pain or stiffness  RESPIRATORY: No cough, wheezing, hemoptysis; No shortness of breath  CARDIOVASCULAR: No chest pain or palpitations  GASTROINTESTINAL: See HPI  GENITOURINARY: No dysuria, frequency or hematuria  NEUROLOGICAL: No numbness or weakness  SKIN: No itching, burning, rashes, or lesions   PSYCH: Normal mood and affect  All other review of systems is negative unless indicated above.    Vital Signs Last 24 Hrs  T(C): 36.7 (28 Nov 2023 08:17), Max: 36.7 (27 Nov 2023 21:22)  T(F): 98 (28 Nov 2023 08:17), Max: 98.1 (27 Nov 2023 21:22)  HR: 94 (28 Nov 2023 09:18) (87 - 94)  BP: 130/75 (28 Nov 2023 09:18) (130/75 - 152/79)  BP(mean): --  RR: 18 (28 Nov 2023 08:17) (18 - 18)  SpO2: 100% (28 Nov 2023 08:17) (100% - 100%)    Parameters below as of 28 Nov 2023 08:17  Patient On (Oxygen Delivery Method): room air        PHYSICAL EXAM:    Constitutional: No acute distress, well-developed, non-toxic appearing  HEENT: masked, good phonation, not icteric  Neck: supple, no lymphadenopathy  Respiratory: clear to ascultation bilaterally, no wheezing  Cardiovascular: S1 and S2, regular rate and rhythm, no murmurs rubs or gallops  Gastrointestinal: soft, non-tender, non-distended, +bowel sounds, no rebound or guarding, no surgical scars, no drains  Extremities: No peripheral edema, no cyanosis or clubbing  Vascular: 2+ peripheral pulses, no venous stasis  Neurological: A/O x 3, no focal deficits, no asterixis  Psychiatric: Normal mood, normal affect  Skin: No rashes, not jaundiced    LABS:                        8.1    7.28  )-----------( 298      ( 28 Nov 2023 06:57 )             27.1     11-27    143  |  112<H>  |  15  ----------------------------<  130<H>  4.0   |  23  |  0.78    Ca    8.6      27 Nov 2023 06:15    TPro  7.0  /  Alb  3.3  /  TBili  0.3  /  DBili  x   /  AST  22  /  ALT  26  /  AlkPhos  61  11-27      LIVER FUNCTIONS - ( 27 Nov 2023 06:15 )  Alb: 3.3 g/dL / Pro: 7.0 gm/dL / ALK PHOS: 61 U/L / ALT: 26 U/L / AST: 22 U/L / GGT: x             RADIOLOGY & ADDITIONAL STUDIES: Patient is a 63y old  Female who presents with a chief complaint of Chest Pain/Angina    63 year old female with significant past medical history of HTN, HLD, DM2, sciatica, and anemia presents to the ED for chest pain. Found to have anemia, CAD requiring cath.     Patient admitted with chest pain. Pain is exertional. Went to ED and on cardiac evaluation found to have CAD.   GI consulted for anemia and epigastric pain with optimization prior to likely necessity for DAPT. Per patient, has been having intermittent epigastric pain for which she has been taking OTC Prilosec/ TUMS without relief. Pain is typically post prandial, lasts 30 min, sharp, epigastric, non radiating, 4/10, without alleviating factors. Denies any overt signs of GIB including hematemesis, hematochezia, or melena. No weight loss. Symptoms last few weeks.  Last EGD/colon with Dr. Gonzáles 5 years ago, patient reports she was prescribed PPI for several months at that time and did not continue. Currently comfortable denies any abdominal or epigastric pain. Endorses hunger. Hgb 8.1.  Cardiology- Dr. Lim/ Dr. Stevens evaluation revealing 70% stenosis proximal LAD with plan for inpatient angiogram with likely intervention.     PAST MEDICAL & SURGICAL HISTORY:  Hypertension    Diabetes mellitus    Hyperlipidemia    MEDICATIONS  (STANDING):  amoxicillin 875 milliGRAM(s) Oral two times a day  aspirin  chewable 81 milliGRAM(s) Oral daily  atorvastatin 40 milliGRAM(s) Oral at bedtime  dextrose 5%. 1000 milliLiter(s) (50 mL/Hr) IV Continuous <Continuous>  dextrose 50% Injectable 25 Gram(s) IV Push once  folic acid 1 milliGRAM(s) Oral daily  glucagon  Injectable 1 milliGRAM(s) IntraMuscular once  insulin lispro (ADMELOG) corrective regimen sliding scale   SubCutaneous three times a day before meals  metoprolol succinate  milliGRAM(s) Oral daily  pantoprazole  Injectable 40 milliGRAM(s) IV Push with breakfast  valsartan 80 milliGRAM(s) Oral daily    MEDICATIONS  (PRN):  acetaminophen     Tablet .. 650 milliGRAM(s) Oral every 6 hours PRN Temp greater or equal to 38C (100.4F), Mild Pain (1 - 3)  aluminum hydroxide/magnesium hydroxide/simethicone Suspension 30 milliLiter(s) Oral every 4 hours PRN Dyspepsia  dextrose Oral Gel 15 Gram(s) Oral once PRN Blood Glucose LESS THAN 70 milliGRAM(s)/deciliter  melatonin 3 milliGRAM(s) Oral at bedtime PRN Insomnia  ondansetron Injectable 4 milliGRAM(s) IV Push every 8 hours PRN Nausea and/or Vomiting      Allergies    No Known Allergies    Intolerances    SOCIAL HISTORY:  no smoking, drinking or drugs    FAMILY HISTORY:  no colon or stomach cancer    REVIEW OF SYSTEMS:    CONSTITUTIONAL: No weakness, fevers or chills  EYES/ENT: No visual changes;  No vertigo or throat pain   NECK: No pain or stiffness  RESPIRATORY: No cough, wheezing, hemoptysis; No shortness of breath  CARDIOVASCULAR: See HPI  GASTROINTESTINAL: See HPI  GENITOURINARY: No dysuria, frequency or hematuria  NEUROLOGICAL: No numbness or weakness  SKIN: No itching, burning, rashes, or lesions   PSYCH: Normal mood and affect  All other review of systems is negative unless indicated above.    Vital Signs Last 24 Hrs  T(C): 36.7 (28 Nov 2023 08:17), Max: 36.7 (27 Nov 2023 21:22)  T(F): 98 (28 Nov 2023 08:17), Max: 98.1 (27 Nov 2023 21:22)  HR: 94 (28 Nov 2023 09:18) (87 - 94)  BP: 130/75 (28 Nov 2023 09:18) (130/75 - 152/79)  BP(mean): --  RR: 18 (28 Nov 2023 08:17) (18 - 18)  SpO2: 100% (28 Nov 2023 08:17) (100% - 100%)    Parameters below as of 28 Nov 2023 08:17  Patient On (Oxygen Delivery Method): room air        PHYSICAL EXAM:    Constitutional: No acute distress, well-developed, non-toxic appearing  HEENT: unmasked, good phonation, not icteric  Neck: supple, no lymphadenopathy  Respiratory: clear to ascultation bilaterally, no wheezing  Cardiovascular: S1 and S2, regular rate and rhythm, no murmurs rubs or gallops  Gastrointestinal: soft, non-tender, non-distended, +bowel sounds, no rebound or guarding,   Extremities: No peripheral edema, no cyanosis or clubbing  Vascular: 2+ peripheral pulses, no venous stasis  Neurological: A/O x 3, no focal deficits, no asterixis  Psychiatric: Normal mood, normal affect  Skin: No rashes, not jaundiced    LABS:                        8.1    7.28  )-----------( 298      ( 28 Nov 2023 06:57 )             27.1     11-27    143  |  112<H>  |  15  ----------------------------<  130<H>  4.0   |  23  |  0.78    Ca    8.6      27 Nov 2023 06:15    TPro  7.0  /  Alb  3.3  /  TBili  0.3  /  DBili  x   /  AST  22  /  ALT  26  /  AlkPhos  61  11-27      LIVER FUNCTIONS - ( 27 Nov 2023 06:15 )  Alb: 3.3 g/dL / Pro: 7.0 gm/dL / ALK PHOS: 61 U/L / ALT: 26 U/L / AST: 22 U/L / GGT: x

## 2023-11-28 NOTE — PROGRESS NOTE ADULT - PROBLEM SELECTOR PLAN 2
Hypertensive in the ED - -200s   Received Labetalol 10mg IV  Continue home meds   - Valsartan 80mg   - Metoprolol Succ 100mg   - Monitor BP
Hypertensive in the ED - -200s   Received Labetalol 10mg IV  Continue home meds   - Valsartan 80mg   - Metoprolol Succ 100mg   - Monitor BP

## 2023-11-28 NOTE — CONSULT NOTE ADULT - NS ATTEND AMEND GEN_ALL_CORE FT
63 year old female with significant past medical history of HTN, HLD, DM2, sciatica, and anemia presents to the ED for chest pain. Found to have anemia, CAD requiring cath.     Plan for EGD to r/o PUD and prevent massive GI bleeding prior to cath.

## 2023-11-28 NOTE — CONSULT NOTE ADULT - ASSESSMENT
63 year old female with CAD, chest pain with CTA showing 70% prox LAD occlusion likely requiring intervention and need for DAPT with concurrent worsening anemia and epigastric pain with possible history PUD/GERD    Rec:  ::EGD tomorrow to rule out UGIB as patient likely need for DAPT  ::NPO p MN  ::PPI  ::Trend HH and transfuse  ::Appreciate continued cardiology evaluation 63 year old female with CAD, chest pain with CTA showing 70% prox LAD occlusion likely requiring intervention and need for DAPT with concurrent worsening anemia and epigastric pain with possible history PUD/GERD    Rec:  ::EGD tomorrow to rule out UGIB, PUD as patient likely need for DAPT  ::NPO p MN  ::PPI  ::Trend HH and transfuse  ::Appreciate continued cardiology evaluation

## 2023-11-28 NOTE — PROGRESS NOTE ADULT - PROBLEM SELECTOR PLAN 4
H/H: 9.0/29.9  - Monitor H/H   - Transfuse as per protocol for Hgb <7  - F/U labs
H/H: 9.0/29.9 down to 8 :likely Fe defiency  guiac negative:hb stable 8.1 with iron /b12/folate  plan:  gi consulted  for endo tomorrow

## 2023-11-28 NOTE — PROGRESS NOTE ADULT - PROBLEM SELECTOR PLAN 1
2 weeks history of intermittent chest pain, worsen today   Outpatient CT from 11/22/23: There is CT evidence of coronary atherosclerosis with severe (>70%) stenosis of the proximal LAD due to calcified plaque. There is borderline severe stenosis (50-70%) of a large sized ramus due to calcified plaque.  Troponin (-)   - PPIDVT prophylaxis  - Cardio consulted Dr. Stevens  will need cath: however will need gi optimization priror
2 weeks history of intermittent chest pain, worsen today   Outpatient CT from 11/22/23: There is CT evidence of coronary atherosclerosis with severe (>70%) stenosis of the proximal LAD due to calcified plaque. There is borderline severe stenosis (50-70%) of a large sized ramus due to calcified plaque.  Troponin (-)   - Tele   - ASA   - Statin   - NPO  - PPI   - DVT prophylaxis  - Cardio consulted Dr. Palla --> to be taken to the Cath Lab

## 2023-11-29 ENCOUNTER — TRANSCRIPTION ENCOUNTER (OUTPATIENT)
Age: 63
End: 2023-11-29

## 2023-11-29 ENCOUNTER — RESULT REVIEW (OUTPATIENT)
Age: 63
End: 2023-11-29

## 2023-11-29 VITALS
OXYGEN SATURATION: 93 % | RESPIRATION RATE: 18 BRPM | DIASTOLIC BLOOD PRESSURE: 71 MMHG | SYSTOLIC BLOOD PRESSURE: 117 MMHG | HEART RATE: 81 BPM | TEMPERATURE: 98 F

## 2023-11-29 LAB
ANION GAP SERPL CALC-SCNC: 8 MMOL/L — SIGNIFICANT CHANGE UP (ref 5–17)
ANION GAP SERPL CALC-SCNC: 8 MMOL/L — SIGNIFICANT CHANGE UP (ref 5–17)
BASOPHILS # BLD AUTO: 0.07 K/UL — SIGNIFICANT CHANGE UP (ref 0–0.2)
BASOPHILS # BLD AUTO: 0.07 K/UL — SIGNIFICANT CHANGE UP (ref 0–0.2)
BASOPHILS NFR BLD AUTO: 0.8 % — SIGNIFICANT CHANGE UP (ref 0–2)
BASOPHILS NFR BLD AUTO: 0.8 % — SIGNIFICANT CHANGE UP (ref 0–2)
BUN SERPL-MCNC: 16 MG/DL — SIGNIFICANT CHANGE UP (ref 7–23)
BUN SERPL-MCNC: 16 MG/DL — SIGNIFICANT CHANGE UP (ref 7–23)
CALCIUM SERPL-MCNC: 8.6 MG/DL — SIGNIFICANT CHANGE UP (ref 8.5–10.1)
CALCIUM SERPL-MCNC: 8.6 MG/DL — SIGNIFICANT CHANGE UP (ref 8.5–10.1)
CHLORIDE SERPL-SCNC: 111 MMOL/L — HIGH (ref 96–108)
CHLORIDE SERPL-SCNC: 111 MMOL/L — HIGH (ref 96–108)
CO2 SERPL-SCNC: 22 MMOL/L — SIGNIFICANT CHANGE UP (ref 22–31)
CO2 SERPL-SCNC: 22 MMOL/L — SIGNIFICANT CHANGE UP (ref 22–31)
CREAT SERPL-MCNC: 0.77 MG/DL — SIGNIFICANT CHANGE UP (ref 0.5–1.3)
CREAT SERPL-MCNC: 0.77 MG/DL — SIGNIFICANT CHANGE UP (ref 0.5–1.3)
EGFR: 86 ML/MIN/1.73M2 — SIGNIFICANT CHANGE UP
EGFR: 86 ML/MIN/1.73M2 — SIGNIFICANT CHANGE UP
EOSINOPHIL # BLD AUTO: 0.42 K/UL — SIGNIFICANT CHANGE UP (ref 0–0.5)
EOSINOPHIL # BLD AUTO: 0.42 K/UL — SIGNIFICANT CHANGE UP (ref 0–0.5)
EOSINOPHIL NFR BLD AUTO: 5.1 % — SIGNIFICANT CHANGE UP (ref 0–6)
EOSINOPHIL NFR BLD AUTO: 5.1 % — SIGNIFICANT CHANGE UP (ref 0–6)
GLUCOSE BLDC GLUCOMTR-MCNC: 122 MG/DL — HIGH (ref 70–99)
GLUCOSE BLDC GLUCOMTR-MCNC: 122 MG/DL — HIGH (ref 70–99)
GLUCOSE BLDC GLUCOMTR-MCNC: 134 MG/DL — HIGH (ref 70–99)
GLUCOSE BLDC GLUCOMTR-MCNC: 134 MG/DL — HIGH (ref 70–99)
GLUCOSE SERPL-MCNC: 137 MG/DL — HIGH (ref 70–99)
GLUCOSE SERPL-MCNC: 137 MG/DL — HIGH (ref 70–99)
HCT VFR BLD CALC: 26.9 % — LOW (ref 34.5–45)
HCT VFR BLD CALC: 26.9 % — LOW (ref 34.5–45)
HGB BLD-MCNC: 7.8 G/DL — LOW (ref 11.5–15.5)
HGB BLD-MCNC: 7.8 G/DL — LOW (ref 11.5–15.5)
IMM GRANULOCYTES NFR BLD AUTO: 0.5 % — SIGNIFICANT CHANGE UP (ref 0–0.9)
IMM GRANULOCYTES NFR BLD AUTO: 0.5 % — SIGNIFICANT CHANGE UP (ref 0–0.9)
LYMPHOCYTES # BLD AUTO: 2.82 K/UL — SIGNIFICANT CHANGE UP (ref 1–3.3)
LYMPHOCYTES # BLD AUTO: 2.82 K/UL — SIGNIFICANT CHANGE UP (ref 1–3.3)
LYMPHOCYTES # BLD AUTO: 34 % — SIGNIFICANT CHANGE UP (ref 13–44)
LYMPHOCYTES # BLD AUTO: 34 % — SIGNIFICANT CHANGE UP (ref 13–44)
MCHC RBC-ENTMCNC: 21.7 PG — LOW (ref 27–34)
MCHC RBC-ENTMCNC: 21.7 PG — LOW (ref 27–34)
MCHC RBC-ENTMCNC: 29 GM/DL — LOW (ref 32–36)
MCHC RBC-ENTMCNC: 29 GM/DL — LOW (ref 32–36)
MCV RBC AUTO: 74.9 FL — LOW (ref 80–100)
MCV RBC AUTO: 74.9 FL — LOW (ref 80–100)
MONOCYTES # BLD AUTO: 0.81 K/UL — SIGNIFICANT CHANGE UP (ref 0–0.9)
MONOCYTES # BLD AUTO: 0.81 K/UL — SIGNIFICANT CHANGE UP (ref 0–0.9)
MONOCYTES NFR BLD AUTO: 9.8 % — SIGNIFICANT CHANGE UP (ref 2–14)
MONOCYTES NFR BLD AUTO: 9.8 % — SIGNIFICANT CHANGE UP (ref 2–14)
NEUTROPHILS # BLD AUTO: 4.13 K/UL — SIGNIFICANT CHANGE UP (ref 1.8–7.4)
NEUTROPHILS # BLD AUTO: 4.13 K/UL — SIGNIFICANT CHANGE UP (ref 1.8–7.4)
NEUTROPHILS NFR BLD AUTO: 49.8 % — SIGNIFICANT CHANGE UP (ref 43–77)
NEUTROPHILS NFR BLD AUTO: 49.8 % — SIGNIFICANT CHANGE UP (ref 43–77)
PLATELET # BLD AUTO: 297 K/UL — SIGNIFICANT CHANGE UP (ref 150–400)
PLATELET # BLD AUTO: 297 K/UL — SIGNIFICANT CHANGE UP (ref 150–400)
POTASSIUM SERPL-MCNC: 3.9 MMOL/L — SIGNIFICANT CHANGE UP (ref 3.5–5.3)
POTASSIUM SERPL-MCNC: 3.9 MMOL/L — SIGNIFICANT CHANGE UP (ref 3.5–5.3)
POTASSIUM SERPL-SCNC: 3.9 MMOL/L — SIGNIFICANT CHANGE UP (ref 3.5–5.3)
POTASSIUM SERPL-SCNC: 3.9 MMOL/L — SIGNIFICANT CHANGE UP (ref 3.5–5.3)
RBC # BLD: 3.59 M/UL — LOW (ref 3.8–5.2)
RBC # BLD: 3.59 M/UL — LOW (ref 3.8–5.2)
RBC # FLD: 17.2 % — HIGH (ref 10.3–14.5)
RBC # FLD: 17.2 % — HIGH (ref 10.3–14.5)
SARS-COV-2 RNA SPEC QL NAA+PROBE: SIGNIFICANT CHANGE UP
SARS-COV-2 RNA SPEC QL NAA+PROBE: SIGNIFICANT CHANGE UP
SODIUM SERPL-SCNC: 141 MMOL/L — SIGNIFICANT CHANGE UP (ref 135–145)
SODIUM SERPL-SCNC: 141 MMOL/L — SIGNIFICANT CHANGE UP (ref 135–145)
WBC # BLD: 8.29 K/UL — SIGNIFICANT CHANGE UP (ref 3.8–10.5)
WBC # BLD: 8.29 K/UL — SIGNIFICANT CHANGE UP (ref 3.8–10.5)
WBC # FLD AUTO: 8.29 K/UL — SIGNIFICANT CHANGE UP (ref 3.8–10.5)
WBC # FLD AUTO: 8.29 K/UL — SIGNIFICANT CHANGE UP (ref 3.8–10.5)

## 2023-11-29 PROCEDURE — 88305 TISSUE EXAM BY PATHOLOGIST: CPT | Mod: 26

## 2023-11-29 PROCEDURE — 88312 SPECIAL STAINS GROUP 1: CPT | Mod: 26

## 2023-11-29 PROCEDURE — 43239 EGD BIOPSY SINGLE/MULTIPLE: CPT | Mod: GC

## 2023-11-29 PROCEDURE — 99239 HOSP IP/OBS DSCHRG MGMT >30: CPT

## 2023-11-29 RX ORDER — ASPIRIN/CALCIUM CARB/MAGNESIUM 324 MG
1 TABLET ORAL
Qty: 0 | Refills: 0 | DISCHARGE
Start: 2023-11-29

## 2023-11-29 RX ADMIN — PANTOPRAZOLE SODIUM 40 MILLIGRAM(S): 20 TABLET, DELAYED RELEASE ORAL at 09:03

## 2023-11-29 NOTE — DISCHARGE NOTE PROVIDER - NSDCCPCAREPLAN_GEN_ALL_CORE_FT
PRINCIPAL DISCHARGE DIAGNOSIS  Diagnosis: Chest pain  Assessment and Plan of Treatment: You were admitted for chest pain and found to have anemia.  You underwent a endoscopy to rule out a GI bleed and we did not find any significant bleeding.  You will be transferred to [facility] to undergo a left heart Catheterization.

## 2023-11-29 NOTE — PROGRESS NOTE ADULT - SUBJECTIVE AND OBJECTIVE BOX
64 yo male with c/o chest pain, angina..  Recent out pt CCTA showed > 70% proximal LAD calcified stenosis.   Pain was severe yesterday, radiated to neck.  Had severely elevated BP on arrival to the ER.  Pain improved with BP control.   Also noted to have severe anemia recently and had GI evaluation with pending endoscopies.     Remains angina free at present. No GI bleed detected.       PAST MEDICAL & SURGICAL HISTORY:  Hypertension  Diabetes mellitus  Hyperlipidemia  Anemia - recently diagnosed.  Iron deficiency      PREVIOUS CARDIAC WORKUP:      Echo:  11/9/23  --LV ejection fraction (60 %) is normal.  --The left ventricular filling pattern is consistent with abnormal relaxation.  --The global LV longitudinal strain using the speckle tracking technology is -18.4 %.  --There is trace mitral regurgitation.  --There is mild tricuspid regurgitation.  --There is trace pulmonic regurgitation.  --The right atrial pressure is normal (0 - 5 mm Hg). There is no pulmonary hypertension.  --There is no pericardial effusion.      ALLERGIES:    No Known Allergies      MEDICATIONS  (STANDING):  amoxicillin 875 milliGRAM(s) Oral two times a day  aspirin  chewable 81 milliGRAM(s) Oral daily  atorvastatin 40 milliGRAM(s) Oral at bedtime  dextrose 5%. 1000 milliLiter(s) (50 mL/Hr) IV Continuous <Continuous>  dextrose 50% Injectable 25 Gram(s) IV Push once  glucagon  Injectable 1 milliGRAM(s) IntraMuscular once  insulin lispro (ADMELOG) corrective regimen sliding scale   SubCutaneous three times a day before meals  metoprolol succinate  milliGRAM(s) Oral daily  pantoprazole  Injectable 40 milliGRAM(s) IV Push with breakfast  valsartan 80 milliGRAM(s) Oral daily    MEDICATIONS  (PRN):  acetaminophen     Tablet .. 650 milliGRAM(s) Oral every 6 hours PRN Temp greater or equal to 38C (100.4F), Mild Pain (1 - 3)  aluminum hydroxide/magnesium hydroxide/simethicone Suspension 30 milliLiter(s) Oral every 4 hours PRN Dyspepsia  dextrose Oral Gel 15 Gram(s) Oral once PRN Blood Glucose LESS THAN 70 milliGRAM(s)/deciliter  melatonin 3 milliGRAM(s) Oral at bedtime PRN Insomnia  ondansetron Injectable 4 milliGRAM(s) IV Push every 8 hours PRN Nausea and/or Vomiting      Vital Signs Last 24 Hrs  T(C): 36.7 (27 Nov 2023 21:22), Max: 36.9 (27 Nov 2023 08:01)  T(F): 98.1 (27 Nov 2023 21:22), Max: 98.4 (27 Nov 2023 08:01)  HR: 87 (27 Nov 2023 21:22) (87 - 92)  BP: 152/79 (27 Nov 2023 21:22) (141/77 - 152/79)  RR: 18 (27 Nov 2023 21:22) (18 - 18)  SpO2: 100% (27 Nov 2023 21:22) (95% - 100%)    Parameters below as of 27 Nov 2023 21:22  Patient On (Oxygen Delivery Method): room air      PHYSICAL EXAM:    General:                Comfortable, AAO X 3, in no distress.  HEENT:                  Unremarkable. Pallor   Neck:                     Supple, no adenopathy, no thyromegaly, no JVD, no bruit.  Chest:                    Clear, B/L symmetric air entry, no tachypnea  Heart:                     S1, S2 normal, no gallop, no murmur.  Abdomen:              Soft, non-tender, bowel sounds active. No palpable masses.  Extremities:           no cyanosis, no edema.   Neurologic:            Grossly nonfocal.       TELEMETRY:    Normal sinus rhythm with no tachy or digna events     ECG:  NSR, normal      LABS:                        8.1    7.66  )-----------( 309      ( 27 Nov 2023 11:01 )             27.1     11-27    143  |  112<H>  |  15  ----------------------------<  130<H>  4.0   |  23  |  0.78    Ca    8.6      27 Nov 2023 06:15    TPro  7.0  /  Alb  3.3  /  TBili  0.3  /  DBili  x   /  AST  22  /  ALT  26  /  AlkPhos  61  11-27      Lipid Panel 11-27 @ 06:15  Chl 130  HDL 38  LDL 71  Trg 116      11-27 @ 06:15  Trop-I 3.92    11-26 @ 12:10  Trop-I 4.76      PT/INR - ( 26 Nov 2023 12:10 )   PT: 11.5 sec;   INR: 1.02 ratio    PTT - ( 26 Nov 2023 12:10 )  PTT:29.2 sec      RADIOLOGY & ADDITIONAL STUDIES:    < from: Xray Chest 1 View- PORTABLE-Urgent (11.26.23 @ 12:12) >  IMPRESSION:  No active parenchymal disease in the chest.
Patient is a 63y old  Female who presents with a chief complaint of Chest Pain/Angina (29 Nov 2023 07:56)      Patient seen and examined at bedside. No overnight events reported.     ALLERGIES:  No Known Allergies    MEDICATIONS  (STANDING):  amoxicillin 875 milliGRAM(s) Oral two times a day  aspirin  chewable 81 milliGRAM(s) Oral daily  atorvastatin 40 milliGRAM(s) Oral at bedtime  dextrose 5%. 1000 milliLiter(s) (50 mL/Hr) IV Continuous <Continuous>  dextrose 50% Injectable 25 Gram(s) IV Push once  folic acid 1 milliGRAM(s) Oral daily  glucagon  Injectable 1 milliGRAM(s) IntraMuscular once  insulin lispro (ADMELOG) corrective regimen sliding scale   SubCutaneous three times a day before meals  metoprolol succinate  milliGRAM(s) Oral daily  pantoprazole  Injectable 40 milliGRAM(s) IV Push with breakfast  valsartan 80 milliGRAM(s) Oral daily    MEDICATIONS  (PRN):  acetaminophen     Tablet .. 650 milliGRAM(s) Oral every 6 hours PRN Temp greater or equal to 38C (100.4F), Mild Pain (1 - 3)  aluminum hydroxide/magnesium hydroxide/simethicone Suspension 30 milliLiter(s) Oral every 4 hours PRN Dyspepsia  dextrose Oral Gel 15 Gram(s) Oral once PRN Blood Glucose LESS THAN 70 milliGRAM(s)/deciliter  melatonin 3 milliGRAM(s) Oral at bedtime PRN Insomnia  ondansetron Injectable 4 milliGRAM(s) IV Push every 8 hours PRN Nausea and/or Vomiting    Vital Signs Last 24 Hrs  T(F): 98.2 (29 Nov 2023 07:52), Max: 99 (28 Nov 2023 20:09)  HR: 81 (29 Nov 2023 07:52) (81 - 86)  BP: 117/71 (29 Nov 2023 07:52) (117/71 - 132/81)  RR: 18 (29 Nov 2023 07:52) (16 - 18)  SpO2: 93% (29 Nov 2023 07:52) (93% - 96%)  I&O's Summary    PHYSICAL EXAM:  General: NAD, A/O x 3  ENT: No gross hearing impairment, Moist mucous membranes, no thrush  Neck: Supple, No JVD  Lungs: Clear to auscultation bilaterally, good air entry, non-labored breathing  Cardio: RRR, S1/S2, No murmur  Abdomen: Soft, Nontender, Nondistended; Bowel sounds present  Extremities: No calf tenderness, No cyanosis, No pitting edema  Psych: Appropriate mood and affect    LABS:                        7.8    8.29  )-----------( 297      ( 29 Nov 2023 06:25 )             26.9     11-29    141  |  111  |  16  ----------------------------<  137  3.9   |  22  |  0.77    Ca    8.6      29 Nov 2023 06:25    TPro  7.0  /  Alb  3.3  /  TBili  0.3  /  DBili  x   /  AST  22  /  ALT  26  /  AlkPhos  61  11-27    CARDIAC MARKERS ( 27 Nov 2023 06:15 )  x     / 3.92 ng/L / x     / x     / x        11-27 Chol 130 mg/dL LDL -- HDL 38 mg/dL Trig 116 mg/dL    POCT Blood Glucose.: 122 mg/dL (29 Nov 2023 12:39)  POCT Blood Glucose.: 134 mg/dL (29 Nov 2023 06:07)  POCT Blood Glucose.: 158 mg/dL (28 Nov 2023 17:10)  Urinalysis Basic - ( 29 Nov 2023 06:25 )    Color: x / Appearance: x / SG: x / pH: x  Gluc: 137 mg/dL / Ketone: x  / Bili: x / Urobili: x   Blood: x / Protein: x / Nitrite: x   Leuk Esterase: x / RBC: x / WBC x   Sq Epi: x / Non Sq Epi: x / Bacteria: x    RADIOLOGY & ADDITIONAL TESTS:    Care Discussed with Consultants/Other Providers:   
64 yo male with c/o chest pain, angina..  Recent out pt CCTA showed > 70% proximal LAD calcified stenosis.   Pain was severe yesterday, radiated to neck.  Had severely elevated BP on arrival to the ER.  Pain improved with BP control.   Also noted to have severe anemia recently and had GI evaluation with pending endoscopies.     Remains angina free at present. No GI bleed detected. Endoscopy scheduled for today      PAST MEDICAL & SURGICAL HISTORY:  Hypertension  Diabetes mellitus  Hyperlipidemia  Anemia - recently diagnosed.  Iron deficiency      PREVIOUS CARDIAC WORKUP:      Echo:  11/9/23  --LV ejection fraction (60 %) is normal.  --The left ventricular filling pattern is consistent with abnormal relaxation.  --The global LV longitudinal strain using the speckle tracking technology is -18.4 %.  --There is trace mitral regurgitation.  --There is mild tricuspid regurgitation.  --There is trace pulmonic regurgitation.  --The right atrial pressure is normal (0 - 5 mm Hg). There is no pulmonary hypertension.  --There is no pericardial effusion.      ALLERGIES:    No Known Allergies      MEDICATIONS  (STANDING):  amoxicillin 875 milliGRAM(s) Oral two times a day  aspirin  chewable 81 milliGRAM(s) Oral daily  atorvastatin 40 milliGRAM(s) Oral at bedtime  dextrose 5%. 1000 milliLiter(s) (50 mL/Hr) IV Continuous <Continuous>  dextrose 50% Injectable 25 Gram(s) IV Push once  folic acid 1 milliGRAM(s) Oral daily  glucagon  Injectable 1 milliGRAM(s) IntraMuscular once  insulin lispro (ADMELOG) corrective regimen sliding scale   SubCutaneous three times a day before meals  metoprolol succinate  milliGRAM(s) Oral daily  pantoprazole  Injectable 40 milliGRAM(s) IV Push with breakfast  valsartan 80 milliGRAM(s) Oral daily    MEDICATIONS  (PRN):  acetaminophen     Tablet .. 650 milliGRAM(s) Oral every 6 hours PRN Temp greater or equal to 38C (100.4F), Mild Pain (1 - 3)  aluminum hydroxide/magnesium hydroxide/simethicone Suspension 30 milliLiter(s) Oral every 4 hours PRN Dyspepsia  dextrose Oral Gel 15 Gram(s) Oral once PRN Blood Glucose LESS THAN 70 milliGRAM(s)/deciliter  melatonin 3 milliGRAM(s) Oral at bedtime PRN Insomnia  ondansetron Injectable 4 milliGRAM(s) IV Push every 8 hours PRN Nausea and/or Vomiting        Vital Signs Last 24 Hrs  T(C): 36.8 (29 Nov 2023 07:52), Max: 37.2 (28 Nov 2023 20:09)  T(F): 98.2 (29 Nov 2023 07:52), Max: 99 (28 Nov 2023 20:09)  HR: 81 (29 Nov 2023 07:52) (81 - 94)  BP: 117/71 (29 Nov 2023 07:52) (117/71 - 132/81)  RR: 18 (29 Nov 2023 07:52) (16 - 18)  SpO2: 93% (29 Nov 2023 07:52) (93% - 96%)    Parameters below as of 29 Nov 2023 07:52  Patient On (Oxygen Delivery Method): room air      PHYSICAL EXAM:    General:                Comfortable, AAO X 3, in no distress.  HEENT:                  Unremarkable. Pallor   Neck:                     Supple, no adenopathy, no thyromegaly, no JVD, no bruit.  Chest:                    Clear, B/L symmetric air entry, no tachypnea  Heart:                     S1, S2 normal, no gallop, no murmur.  Abdomen:              Soft, non-tender, bowel sounds active. No palpable masses.  Extremities:           no cyanosis, no edema.   Neurologic:            Grossly nonfocal.       TELEMETRY:    Normal sinus rhythm with no tachy or digna events     ECG:  NSR, normal    LABS:                        7.8    8.29  )-----------( 297      ( 29 Nov 2023 06:25 )             26.9     11-29    141  |  111<H>  |  16  ----------------------------<  137<H>  3.9   |  22  |  0.77    Ca    8.6      29 Nov 2023 06:25      Lipid Panel 11-27 @ 06:15  Chl 130  HDL 38  LDL 71  Trg 116      11-27 @ 06:15  Trop-I 3.92    11-26 @ 12:10  Trop-I 4.76        RADIOLOGY & ADDITIONAL STUDIES:    
patient seen and examined   and brother at bedside  patient reports epigastric pain  hb 8  guiac negative  patient denies any noticeable pain on defecation or blood (dark, blood or black) in stool or urine  Review of Systems:  General:denies fever chills, headache, weakness  HEENT: denies blurry vision,diffculty swallowing, difficulty hearing, tinnitus  Cardiovascular: denies chest pain  ,palpitations  Pulmonary:denies shortness of breath, cough, wheezing, hemoptysis  Gastrointestinal: denies abdominal pain, constipation, diarrhea,nausea , vomiting, hematochezia  : denies hematuria, dysuria, or incontinence  Neurological: denies weakness, numbness , tingling, dizziness, tremors  MSK: denies muscle pain, difficulty ambulating, swelling, back pain  skin: denies skin rash, itching, burning, or  skin lesions  Psychiatrical: denies mood disturbances, anxierty, feeling depressed, depression , or difficulty sleeping    Objective:  Vitals  T(C): 36.9 (11-27-23 @ 08:01), Max: 37.1 (11-26-23 @ 17:37)  HR: 92 (11-27-23 @ 14:28) (82 - 92)  BP: 144/70 (11-27-23 @ 14:28) (139/71 - 187/88)  RR: 18 (11-27-23 @ 08:01) (18 - 18)  SpO2: 95% (11-27-23 @ 08:01) (95% - 100%)    Physical Exam:  General: comfortable, no acute distress  HEENT: Atraumatic, no LAD, trachea midline, PERRLA  Cardiovascular: normal s1s2, no murmurs, gallops or fricition rubs  Pulmonary: clear to ausculation Bilaterally, no wheezing , rhonchi  Gastrointestinal: soft non tender non distended, no masses felt, no organomegally  Muscloskeletal: no lower extremity edema, intact bilateral lower extremity pulses  Neurological: CN II-12 intact. No focal weakness  Psychiatrical: normal mood, cooperative  SKIN: no rash, lesions or ulcers    Labs:                          8.1    7.66  )-----------( 309      ( 27 Nov 2023 11:01 )             27.1     11-27    143  |  112<H>  |  15  ----------------------------<  130<H>  4.0   |  23  |  0.78    Ca    8.6      27 Nov 2023 06:15    TPro  7.0  /  Alb  3.3  /  TBili  0.3  /  DBili  x   /  AST  22  /  ALT  26  /  AlkPhos  61  11-27    LIVER FUNCTIONS - ( 27 Nov 2023 06:15 )  Alb: 3.3 g/dL / Pro: 7.0 gm/dL / ALK PHOS: 61 U/L / ALT: 26 U/L / AST: 22 U/L / GGT: x           PT/INR - ( 26 Nov 2023 12:10 )   PT: 11.5 sec;   INR: 1.02 ratio         PTT - ( 26 Nov 2023 12:10 )  PTT:29.2 sec      Active Medications  MEDICATIONS  (STANDING):  amoxicillin 875 milliGRAM(s) Oral two times a day  aspirin  chewable 81 milliGRAM(s) Oral daily  atorvastatin 40 milliGRAM(s) Oral at bedtime  cyanocobalamin Injectable 1000 MICROGram(s) IntraMuscular once  dextrose 5%. 1000 milliLiter(s) (50 mL/Hr) IV Continuous <Continuous>  dextrose 50% Injectable 25 Gram(s) IV Push once  folic acid Injectable 1 milliGRAM(s) IV Push once  glucagon  Injectable 1 milliGRAM(s) IntraMuscular once  insulin lispro (ADMELOG) corrective regimen sliding scale   SubCutaneous three times a day before meals  iron sucrose Injectable 200 milliGRAM(s) IV Push once  metoprolol succinate  milliGRAM(s) Oral daily  pantoprazole  Injectable 40 milliGRAM(s) IV Push with breakfast  valsartan 80 milliGRAM(s) Oral daily    MEDICATIONS  (PRN):  acetaminophen     Tablet .. 650 milliGRAM(s) Oral every 6 hours PRN Temp greater or equal to 38C (100.4F), Mild Pain (1 - 3)  aluminum hydroxide/magnesium hydroxide/simethicone Suspension 30 milliLiter(s) Oral every 4 hours PRN Dyspepsia  dextrose Oral Gel 15 Gram(s) Oral once PRN Blood Glucose LESS THAN 70 milliGRAM(s)/deciliter  melatonin 3 milliGRAM(s) Oral at bedtime PRN Insomnia  ondansetron Injectable 4 milliGRAM(s) IV Push every 8 hours PRN Nausea and/or Vomiting    
patient seen and examined  comfortable  denies nausea  hb 8.1 today  held off on prbc  given another dose of  venofer  / b12  / and folic acid  Review of Systems:  General:denies fever chills, headache, weakness  HEENT: denies blurry vision,diffculty swallowing, difficulty hearing, tinnitus  Cardiovascular: denies chest pain  ,palpitations  Pulmonary:denies shortness of breath, cough, wheezing, hemoptysis  Gastrointestinal: denies abdominal pain, constipation, diarrhea,nausea , vomiting, hematochezia  : denies hematuria, dysuria, or incontinence  Neurological: denies weakness, numbness , tingling, dizziness, tremors  MSK: denies muscle pain, difficulty ambulating, swelling, back pain  skin: denies skin rash, itching, burning, or  skin lesions  Psychiatrical: denies mood disturbances, anxierty, feeling depressed, depression , or difficulty sleeping    Objective:  Vitals  T(C): 36.7 (11-28-23 @ 08:17), Max: 36.7 (11-27-23 @ 21:22)  HR: 94 (11-28-23 @ 09:18) (87 - 94)  BP: 130/75 (11-28-23 @ 09:18) (130/75 - 152/79)  RR: 18 (11-28-23 @ 08:17) (18 - 18)  SpO2: 100% (11-28-23 @ 08:17) (100% - 100%)    Physical Exam:  General: comfortable, no acute distress  HEENT: Atraumatic, no LAD, trachea midline, PERRLA  Cardiovascular: normal s1s2, no murmurs, gallops or fricition rubs  Pulmonary: clear to ausculation Bilaterally, no wheezing , rhonchi  Gastrointestinal: soft non tender non distended, no masses felt, no organomegally  Muscloskeletal: no lower extremity edema, intact bilateral lower extremity pulses  Neurological: CN II-12 intact. No focal weakness  Psychiatrical: normal mood, cooperative  SKIN: no rash, lesions or ulcers    Labs:                          8.1    7.28  )-----------( 298      ( 28 Nov 2023 06:57 )             27.1     11-27    143  |  112<H>  |  15  ----------------------------<  130<H>  4.0   |  23  |  0.78    Ca    8.6      27 Nov 2023 06:15    TPro  7.0  /  Alb  3.3  /  TBili  0.3  /  DBili  x   /  AST  22  /  ALT  26  /  AlkPhos  61  11-27    LIVER FUNCTIONS - ( 27 Nov 2023 06:15 )  Alb: 3.3 g/dL / Pro: 7.0 gm/dL / ALK PHOS: 61 U/L / ALT: 26 U/L / AST: 22 U/L / GGT: x                 Active Medications  MEDICATIONS  (STANDING):  amoxicillin 875 milliGRAM(s) Oral two times a day  aspirin  chewable 81 milliGRAM(s) Oral daily  atorvastatin 40 milliGRAM(s) Oral at bedtime  dextrose 5%. 1000 milliLiter(s) (50 mL/Hr) IV Continuous <Continuous>  dextrose 50% Injectable 25 Gram(s) IV Push once  folic acid 1 milliGRAM(s) Oral daily  glucagon  Injectable 1 milliGRAM(s) IntraMuscular once  insulin lispro (ADMELOG) corrective regimen sliding scale   SubCutaneous three times a day before meals  metoprolol succinate  milliGRAM(s) Oral daily  pantoprazole  Injectable 40 milliGRAM(s) IV Push with breakfast  valsartan 80 milliGRAM(s) Oral daily    MEDICATIONS  (PRN):  acetaminophen     Tablet .. 650 milliGRAM(s) Oral every 6 hours PRN Temp greater or equal to 38C (100.4F), Mild Pain (1 - 3)  aluminum hydroxide/magnesium hydroxide/simethicone Suspension 30 milliLiter(s) Oral every 4 hours PRN Dyspepsia  dextrose Oral Gel 15 Gram(s) Oral once PRN Blood Glucose LESS THAN 70 milliGRAM(s)/deciliter  melatonin 3 milliGRAM(s) Oral at bedtime PRN Insomnia  ondansetron Injectable 4 milliGRAM(s) IV Push every 8 hours PRN Nausea and/or Vomiting

## 2023-11-29 NOTE — DISCHARGE NOTE PROVIDER - HOSPITAL COURSE
Hospital Course  HPI:  63 year old female with a PMHx of HTN, HLD, DM2, sciatica, and anemia presents to the ED for chest pain. Endorses she has been having intermittent chest pain for the past 2 weeks, but this morning pain persisted, radiated to her neck (pressure like) and was more severe which prompted ED visit. Patient was seen by her cardiologist (Dr. Lim), who noticed "blockage to the left side of the heart" on CT scan done as outpatient. Upon evaluation, patient was found to be hypertensive -200s, Endorses pain has improved drastically, denies palpitation SOB, headache, dizziness or any other acute symptoms. In the ED labs remarkable for H/H 9.0/29.9. Troponin (-), ECG : Sinus tachy. No ST changes. Cardio consulted in the ED. Due to Blockage seen in the CT, patient will be taken to the Cath lab,  (26 Nov 2023 18:31)    You were admitted for chest pain and found to have anemia.  You underwent a endoscopy to rule out a GI bleed and we did not find any significant bleeding.  You will be transferred to [facility] to undergo a left heart Catheterization.      Discharging Provider:  Johann Collazo MD  Contact Info: Cell [phone] - Please call with any questions or concerns.

## 2023-11-29 NOTE — DISCHARGE NOTE PROVIDER - NSDCMRMEDTOKEN_GEN_ALL_CORE_FT
amoxicillin 875 mg oral tablet: 1 tab(s) orally 2 times a day for 7 days **course not complete***  aspirin 81 mg oral tablet, chewable: 1 tab(s) orally once a day  atorvastatin 40 mg oral tablet: 1 tab(s) orally once a day  cholecalciferol 25 mcg (1000 intl units) oral tablet: 1 tab(s) orally once a day  Jentadueto 2.5 mg-850 mg oral tablet: 1 tab(s) orally 2 times a day  metoprolol succinate 100 mg oral tablet, extended release: 1 tab(s) orally once a day  valsartan 80 mg oral tablet: 1 tab(s) orally once a day

## 2023-11-29 NOTE — DISCHARGE NOTE NURSING/CASE MANAGEMENT/SOCIAL WORK - PATIENT PORTAL LINK FT
You can access the FollowMyHealth Patient Portal offered by [facility] by registering at the following website: [email or web address]. By joining [email or web address]’s FollowMyHealth portal, you will also be able to view your health information using other applications (apps) compatible with our system.

## 2023-11-29 NOTE — PROGRESS NOTE ADULT - ASSESSMENT
CAD with Unstable angina in setting of uncontrolled HTN and severe anemia. No active chest pain now  DM  HLD    Suggest:  For endoscopy today  Cardiac status is stable for endoscopy  Will proceed with further cardiac treatment after endoscopy and GI clearance.   Meanwhile edical optimization of CAD  Optimize BP.  Increase beat blocker dose if tolerates  F/U H/H today.   Start Plavix after GI work up.   Aspirin permitted by GI  High dose statins  Add Nitrates if has angina  Ischemia evaluation with cardiac cath once anemia etiology is clear and stabilized. Patient will need high dose anticoagulation during and after cardiac cath.  Will need clearance form GI to start anticoagulation. 
CAD with Unstable angina in setting of uncontrolled HTN and severe anemia. No active chest pain now  DM  HLD    Suggest:    Medical optimization of CAD  Optimize BP.  Increase beat blockers and ARBs  F/U H/H today.   Start Plavix.   Aspirin if permitted by GI  High dose statins  Add Nitrates if has angina  Ischemia evaluation with cardiac cath once anemia etiology is clear and stabilized. Patient will need high dose anticoagulation during and after cardiac cath.  Will need clearance form GI to start anticoagulation.   GI consult pending
63 year old female with a PMHx of HTN, HLD, DM2, sciatica, and anemia presents to the ED for chest pain. Endorses she has been having intermittent chest pain for the past 2 weeks, but this morning pain persisted, radiated to her neck (pressure like) and was more severe which prompted ED visit. Patient was seen by her cardiologist (Dr. Lim), who noticed "blockage to the left side of the heart" on CT scan done as outpatient. Upon evaluation, patient was found to be hypertensive -200s, Endorses pain has improved drastically, denies palpitation SOB, headache, dizziness or any other acute symptoms. In the ED labs remarkable for H/H 9.0/29.9. Troponin (-), ECG : Sinus tachy. No ST changes. Cardio consulted in the ED. Due to Blockage seen in the CT, patient will be taken to the Cath lab, 
63 year old female with a PMHx of HTN, HLD, DM2, sciatica, and anemia presents to the ED for chest pain. Endorses she has been having intermittent chest pain for the past 2 weeks, but this morning pain persisted, radiated to her neck (pressure like) and was more severe which prompted ED visit. Patient was seen by her cardiologist (Dr. Lim), who noticed "blockage to the left side of the heart" on CT scan done as outpatient. Upon evaluation, patient was found to be hypertensive -200s, Endorses pain has improved drastically, denies palpitation SOB, headache, dizziness or any other acute symptoms. In the ED labs remarkable for H/H 9.0/29.9. Troponin (-), ECG : Sinus tachy. No ST changes. Cardio consulted in the ED. Due to Blockage seen in the CT.     Chest pain with history of severe stenosis   - for [facility] today  - cardiology Dr. Stevens following    Anemia of chronic disease versus GI bleed  - hemoglbin 7.8  - will give 1 unit of PRBCs   - endoscopy without GI bleeding     Hypertension  - improved     DM2  - TIDACHS    for transfer to [facility] under Dr. Stevens for C    Discharge Planning 35 minutes    
63 year old female with a PMHx of HTN, HLD, DM2, sciatica, and anemia presents to the ED for chest pain. Endorses she has been having intermittent chest pain for the past 2 weeks, but this morning pain persisted, radiated to her neck (pressure like) and was more severe which prompted ED visit. Patient was seen by her cardiologist (Dr. Lim), who noticed "blockage to the left side of the heart" on CT scan done as outpatient. Upon evaluation, patient was found to be hypertensive -200s, Endorses pain has improved drastically, denies palpitation SOB, headache, dizziness or any other acute symptoms. In the ED labs remarkable for H/H 9.0/29.9. Troponin (-), ECG : Sinus tachy. No ST changes. Cardio consulted in the ED. Due to Blockage seen in the CT, patient will be taken to the Cath lab,

## 2023-11-29 NOTE — BRIEF OPERATIVE NOTE - COMMENTS
not a contraindication to needed cath  will need BID PPI x 2 months then PPI daily for at least a year  repeat egd in 2-3 months

## 2023-11-30 LAB
SURGICAL PATHOLOGY STUDY: SIGNIFICANT CHANGE UP
SURGICAL PATHOLOGY STUDY: SIGNIFICANT CHANGE UP

## 2023-12-02 DIAGNOSIS — D50.9 IRON DEFICIENCY ANEMIA, UNSPECIFIED: ICD-10-CM

## 2023-12-02 DIAGNOSIS — E11.9 TYPE 2 DIABETES MELLITUS WITHOUT COMPLICATIONS: ICD-10-CM

## 2023-12-02 DIAGNOSIS — D63.8 ANEMIA IN OTHER CHRONIC DISEASES CLASSIFIED ELSEWHERE: ICD-10-CM

## 2023-12-02 DIAGNOSIS — I10 ESSENTIAL (PRIMARY) HYPERTENSION: ICD-10-CM

## 2023-12-02 DIAGNOSIS — I25.110 ATHEROSCLEROTIC HEART DISEASE OF NATIVE CORONARY ARTERY WITH UNSTABLE ANGINA PECTORIS: ICD-10-CM

## 2023-12-02 DIAGNOSIS — M54.30 SCIATICA, UNSPECIFIED SIDE: ICD-10-CM

## 2023-12-02 DIAGNOSIS — K25.9 GASTRIC ULCER, UNSPECIFIED AS ACUTE OR CHRONIC, WITHOUT HEMORRHAGE OR PERFORATION: ICD-10-CM

## 2023-12-02 DIAGNOSIS — E78.5 HYPERLIPIDEMIA, UNSPECIFIED: ICD-10-CM

## 2023-12-04 PROBLEM — E11.9 TYPE 2 DIABETES MELLITUS WITHOUT COMPLICATIONS: Chronic | Status: ACTIVE | Noted: 2023-11-26

## 2023-12-04 PROBLEM — E78.5 HYPERLIPIDEMIA, UNSPECIFIED: Chronic | Status: ACTIVE | Noted: 2023-11-26

## 2023-12-04 PROBLEM — I10 ESSENTIAL (PRIMARY) HYPERTENSION: Chronic | Status: ACTIVE | Noted: 2023-11-26

## 2024-01-03 ENCOUNTER — APPOINTMENT (OUTPATIENT)
Dept: GASTROENTEROLOGY | Facility: CLINIC | Age: 64
End: 2024-01-03
Payer: COMMERCIAL

## 2024-01-03 DIAGNOSIS — Z98.61 CORONARY ANGIOPLASTY STATUS: ICD-10-CM

## 2024-01-03 DIAGNOSIS — K25.0 ACUTE GASTRIC ULCER WITH HEMORRHAGE: ICD-10-CM

## 2024-01-03 PROCEDURE — 99214 OFFICE O/P EST MOD 30 MIN: CPT

## 2024-01-04 RX ORDER — PANTOPRAZOLE 40 MG/1
40 TABLET, DELAYED RELEASE ORAL DAILY
Qty: 1 | Refills: 3 | Status: ACTIVE | COMMUNITY
Start: 2024-01-04 | End: 1900-01-01

## 2024-01-04 NOTE — ADDENDUM
[FreeTextEntry1] : I was present with the NP during the history and exam. I examined the patient and discussed the case with the NP and agree with the findings and plan as documented in the NP's note. Briefly, 63 year old woman with CAD and recent GI bleeding. From a GI perspective, needs repeat EGD for ulcer healing and r/o cancerous ulcer, biopsy for H pylori etc. Issue is that she has had two recent cath's with stents. I am not concerned about antiplatelets, I can always to procedure on them and biopsy, it's the anesthesia that is the issue post cath. Studies show a higher rate of cardiac events when endoscopic eval performed too close to a cath. Patient will talk to her cardiologist. When ready for anesthesia we will book case, she does not need to be off all antiplatelets for the procedure. She will call us after she follows up. Contiue PPI  once daily for life.

## 2024-01-04 NOTE — PHYSICAL EXAM
[Alert] : alert [Healthy Appearing] : healthy appearing [Sclera] : the sclera and conjunctiva were normal [Hearing Threshold Finger Rub Not [place]] : hearing was normal [Normal Appearance] : the appearance of the neck was normal [No Respiratory Distress] : no respiratory distress [Auscultation Breath Sounds / Voice Sounds] : lungs were clear to auscultation bilaterally [Heart Rate And Rhythm] : heart rate was normal and rhythm regular [Bowel Sounds] : normal bowel sounds [Abdomen Tenderness] : non-tender [Abdomen Soft] : soft [Abnormal Walk] : normal gait [Normal Color / Pigmentation] : normal skin color and pigmentation [Oriented To Time, Place, And Person] : oriented to person, place, and time [Normal Voice/Communication] : normal voice/communication [No Clubbing, Cyanosis] : no clubbing or cyanosis of the fingernails [] : no rash [No Focal Deficits] : no focal deficits [Motor Exam] : the motor exam was normal

## 2024-01-04 NOTE — ASSESSMENT
[FreeTextEntry1] : Plan: Since pt has had numerous cardiac stents placed since discharge, would recommend postponing repeat EGD. However would recommend continuing once daily PPI therapy since pt is on daily antiplatelets Instructed pt to discuss with her cardiologist and call us when she is cleared for anesthesia. All questions answered, pt expressed understanding. Seen and discussed with Dr. Conti.

## 2024-01-04 NOTE — REVIEW OF SYSTEMS
[Negative] : Heme/Lymph [As Noted in HPI] : as noted in HPI [FreeTextEntry5] : cardiac cath with stents recently

## 2024-01-04 NOTE — HISTORY OF PRESENT ILLNESS
[FreeTextEntry1] : Maddison Butts is a 63 year old female presenting today for follow up for GI bleeding.   Pt was admitted with melena and anemia in November 2023. She had EGD in 11/29/23 and PUD was observed. Was recommended to have repeat EGD to ensure healing, however during hospitalization had cardiac complications and required transfer for R sided heart cath/stent. Since discharge has been feeling well, denies abdominal pain, nausea, vomiting. However does note she has since had 2 additional cardiac stents placed on left side of heart. Is on plavix and 81 MG ASA daily. Completed two months of PPI therapy, is no longer on medication.  No overt signs of GI bleeding like hematemesis, melena, hematochezia, or coffee grind emesis.  No post prandial symptoms. No nausea or vomiting. No abdominal pain. Good appetite. No weight loss. No jaundice.

## 2024-04-15 NOTE — ED PROVIDER NOTE - HIV OFFER
Spoke with patient to reschedule 4/16 [facility] appointment with Dr. Castellon due to change in provider schedule. Appointment now 4/18 in [facility] with Dr. Leyva    Opt out

## 2024-12-23 ENCOUNTER — RX RENEWAL (OUTPATIENT)
Age: 64
End: 2024-12-23

## 2025-02-16 ENCOUNTER — EMERGENCY (EMERGENCY)
Facility: HOSPITAL | Age: 65
LOS: 0 days | Discharge: HOME | End: 2025-02-16
Attending: EMERGENCY MEDICINE
Payer: COMMERCIAL

## 2025-02-16 VITALS
OXYGEN SATURATION: 99 % | SYSTOLIC BLOOD PRESSURE: 159 MMHG | DIASTOLIC BLOOD PRESSURE: 79 MMHG | RESPIRATION RATE: 18 BRPM | TEMPERATURE: 98 F | HEART RATE: 88 BPM

## 2025-02-16 VITALS
OXYGEN SATURATION: 98 % | HEART RATE: 95 BPM | RESPIRATION RATE: 18 BRPM | SYSTOLIC BLOOD PRESSURE: 166 MMHG | WEIGHT: 178.79 LBS | TEMPERATURE: 98 F | DIASTOLIC BLOOD PRESSURE: 76 MMHG

## 2025-02-16 DIAGNOSIS — R42 DIZZINESS AND GIDDINESS: ICD-10-CM

## 2025-02-16 DIAGNOSIS — E87.6 HYPOKALEMIA: ICD-10-CM

## 2025-02-16 DIAGNOSIS — I10 ESSENTIAL (PRIMARY) HYPERTENSION: ICD-10-CM

## 2025-02-16 DIAGNOSIS — E11.65 TYPE 2 DIABETES MELLITUS WITH HYPERGLYCEMIA: ICD-10-CM

## 2025-02-16 DIAGNOSIS — D50.9 IRON DEFICIENCY ANEMIA, UNSPECIFIED: ICD-10-CM

## 2025-02-16 DIAGNOSIS — I25.10 ATHEROSCLEROTIC HEART DISEASE OF NATIVE CORONARY ARTERY WITHOUT ANGINA PECTORIS: ICD-10-CM

## 2025-02-16 DIAGNOSIS — Z95.5 PRESENCE OF CORONARY ANGIOPLASTY IMPLANT AND GRAFT: ICD-10-CM

## 2025-02-16 DIAGNOSIS — E78.5 HYPERLIPIDEMIA, UNSPECIFIED: ICD-10-CM

## 2025-02-16 LAB
ALBUMIN SERPL ELPH-MCNC: 3.7 G/DL — SIGNIFICANT CHANGE UP (ref 3.3–5)
ALP SERPL-CCNC: 67 U/L — SIGNIFICANT CHANGE UP (ref 40–120)
ALT FLD-CCNC: 17 U/L — SIGNIFICANT CHANGE UP (ref 12–78)
ANION GAP SERPL CALC-SCNC: 5 MMOL/L — SIGNIFICANT CHANGE UP (ref 5–17)
ANISOCYTOSIS BLD QL: SLIGHT — SIGNIFICANT CHANGE UP
APTT BLD: 34 SEC — SIGNIFICANT CHANGE UP (ref 24.5–35.6)
AST SERPL-CCNC: 7 U/L — LOW (ref 15–37)
BASOPHILS # BLD AUTO: 0.05 K/UL — SIGNIFICANT CHANGE UP (ref 0–0.2)
BASOPHILS NFR BLD AUTO: 0.8 % — SIGNIFICANT CHANGE UP (ref 0–2)
BILIRUB SERPL-MCNC: 0.4 MG/DL — SIGNIFICANT CHANGE UP (ref 0.2–1.2)
BLD GP AB SCN SERPL QL: SIGNIFICANT CHANGE UP
BUN SERPL-MCNC: 13 MG/DL — SIGNIFICANT CHANGE UP (ref 7–23)
CALCIUM SERPL-MCNC: 9.3 MG/DL — SIGNIFICANT CHANGE UP (ref 8.5–10.1)
CHLORIDE SERPL-SCNC: 108 MMOL/L — SIGNIFICANT CHANGE UP (ref 96–108)
CO2 SERPL-SCNC: 25 MMOL/L — SIGNIFICANT CHANGE UP (ref 22–31)
CREAT SERPL-MCNC: 0.81 MG/DL — SIGNIFICANT CHANGE UP (ref 0.5–1.3)
EGFR: 81 ML/MIN/1.73M2 — SIGNIFICANT CHANGE UP
EOSINOPHIL # BLD AUTO: 0.31 K/UL — SIGNIFICANT CHANGE UP (ref 0–0.5)
EOSINOPHIL NFR BLD AUTO: 4.8 % — SIGNIFICANT CHANGE UP (ref 0–6)
GLUCOSE SERPL-MCNC: 225 MG/DL — HIGH (ref 70–99)
HCT VFR BLD CALC: 25.1 % — LOW (ref 34.5–45)
HGB BLD-MCNC: 7.3 G/DL — LOW (ref 11.5–15.5)
HYPOCHROMIA BLD QL: SLIGHT — SIGNIFICANT CHANGE UP
IMM GRANULOCYTES # BLD AUTO: 0.03 K/UL — SIGNIFICANT CHANGE UP (ref 0–0.07)
IMM GRANULOCYTES NFR BLD AUTO: 0.5 % — SIGNIFICANT CHANGE UP (ref 0–0.9)
INR BLD: 1.14 RATIO — SIGNIFICANT CHANGE UP (ref 0.85–1.16)
LYMPHOCYTES # BLD AUTO: 1.48 K/UL — SIGNIFICANT CHANGE UP (ref 1–3.3)
LYMPHOCYTES NFR BLD AUTO: 22.7 % — SIGNIFICANT CHANGE UP (ref 13–44)
MANUAL SMEAR VERIFICATION: SIGNIFICANT CHANGE UP
MCHC RBC-ENTMCNC: 21 PG — LOW (ref 27–34)
MCHC RBC-ENTMCNC: 29.1 G/DL — LOW (ref 32–36)
MCV RBC AUTO: 72.1 FL — LOW (ref 80–100)
MICROCYTES BLD QL: ABNORMAL
MONOCYTES # BLD AUTO: 0.61 K/UL — SIGNIFICANT CHANGE UP (ref 0–0.9)
MONOCYTES NFR BLD AUTO: 9.4 % — SIGNIFICANT CHANGE UP (ref 2–14)
NEUTROPHILS # BLD AUTO: 4.03 K/UL — SIGNIFICANT CHANGE UP (ref 1.8–7.4)
NEUTROPHILS NFR BLD AUTO: 61.8 % — SIGNIFICANT CHANGE UP (ref 43–77)
NRBC # BLD AUTO: 0 K/UL — SIGNIFICANT CHANGE UP (ref 0–0)
NRBC # FLD: 0 K/UL — SIGNIFICANT CHANGE UP (ref 0–0)
NRBC BLD AUTO-RTO: 0 /100 WBCS — SIGNIFICANT CHANGE UP (ref 0–0)
OVALOCYTES BLD QL SMEAR: SLIGHT — SIGNIFICANT CHANGE UP
PLAT MORPH BLD: NORMAL — SIGNIFICANT CHANGE UP
PLATELET # BLD AUTO: 276 K/UL — SIGNIFICANT CHANGE UP (ref 150–400)
PMV BLD: 8.7 FL — SIGNIFICANT CHANGE UP (ref 7–13)
POIKILOCYTOSIS BLD QL AUTO: SLIGHT — SIGNIFICANT CHANGE UP
POTASSIUM SERPL-MCNC: 3.4 MMOL/L — LOW (ref 3.5–5.3)
POTASSIUM SERPL-SCNC: 3.4 MMOL/L — LOW (ref 3.5–5.3)
PROT SERPL-MCNC: 7.3 GM/DL — SIGNIFICANT CHANGE UP (ref 6–8.3)
PROTHROM AB SERPL-ACNC: 13.4 SEC — SIGNIFICANT CHANGE UP (ref 9.9–13.4)
RBC # BLD: 3.48 M/UL — LOW (ref 3.8–5.2)
RBC # FLD: 17.8 % — HIGH (ref 10.3–14.5)
RBC BLD AUTO: ABNORMAL
SODIUM SERPL-SCNC: 138 MMOL/L — SIGNIFICANT CHANGE UP (ref 135–145)
TARGETS BLD QL SMEAR: SLIGHT — SIGNIFICANT CHANGE UP
WBC # BLD: 6.51 K/UL — SIGNIFICANT CHANGE UP (ref 3.8–10.5)
WBC # FLD AUTO: 6.51 K/UL — SIGNIFICANT CHANGE UP (ref 3.8–10.5)

## 2025-02-16 PROCEDURE — 93005 ELECTROCARDIOGRAM TRACING: CPT

## 2025-02-16 PROCEDURE — 85025 COMPLETE CBC W/AUTO DIFF WBC: CPT

## 2025-02-16 PROCEDURE — 86901 BLOOD TYPING SEROLOGIC RH(D): CPT

## 2025-02-16 PROCEDURE — 80053 COMPREHEN METABOLIC PANEL: CPT

## 2025-02-16 PROCEDURE — 99285 EMERGENCY DEPT VISIT HI MDM: CPT

## 2025-02-16 PROCEDURE — P9016: CPT

## 2025-02-16 PROCEDURE — 85730 THROMBOPLASTIN TIME PARTIAL: CPT

## 2025-02-16 PROCEDURE — 36430 TRANSFUSION BLD/BLD COMPNT: CPT

## 2025-02-16 PROCEDURE — 36000 PLACE NEEDLE IN VEIN: CPT

## 2025-02-16 PROCEDURE — 86923 COMPATIBILITY TEST ELECTRIC: CPT

## 2025-02-16 PROCEDURE — 99285 EMERGENCY DEPT VISIT HI MDM: CPT | Mod: 25

## 2025-02-16 PROCEDURE — 86850 RBC ANTIBODY SCREEN: CPT

## 2025-02-16 PROCEDURE — 85610 PROTHROMBIN TIME: CPT

## 2025-02-16 PROCEDURE — 86900 BLOOD TYPING SEROLOGIC ABO: CPT

## 2025-02-16 PROCEDURE — 93010 ELECTROCARDIOGRAM REPORT: CPT

## 2025-02-16 PROCEDURE — 36415 COLL VENOUS BLD VENIPUNCTURE: CPT

## 2025-02-16 NOTE — ED ADULT NURSE NOTE - OBJECTIVE STATEMENT
Pt presents to ED sent in by MD for hemoglobin of 7.1. States this has happened in the past and required blood transfusion and iron treatment. Denies dizziness, feeling fatigued, weakness, blood in stool. Pt A&O4 w clear speech, ambulatory. PMH of 3 cardiac stents.

## 2025-02-16 NOTE — ED STATDOCS - PHYSICAL EXAMINATION
Patient awake, alert, orient x 3, no acute distress  Heart sounds within normal limits, regular rate rhythm, no murmur  Lungs are clear bilaterally  Abdomen soft, nontender, nondistended.  Extremities are well-perfused  Skin without rash. Mild pallor.

## 2025-02-16 NOTE — ED STATDOCS - NSFOLLOWUPINSTRUCTIONS_ED_ALL_ED_FT
Iron Deficiency Anemia    WHAT YOU NEED TO KNOW:    What is iron deficiency anemia (FREDDY)? FREDDY happens when your body does not have enough iron to make hemoglobin. This means you will have low hemoglobin and red blood cell levels. Hemoglobin is part of red blood cells and helps carry oxygen throughout your body. Blood loss and not eating enough foods that contain iron are common causes of low iron.    What increases my risk for FREDDY?    Blood loss may reduce iron levels in your blood over time. Ways you may lose blood include:  A female's monthly period    Trauma or bleeding in your intestines    Hemorrhoids    Donating blood more than 5 times a year    Pregnancy increases the amount of iron your body needs.    Low-iron diet such as a strict vegan or vegetarian diet.    Digestive conditions such as Celiac disease, chronic diarrhea or Crohn disease may interfere with how your body absorbs iron from your food.    Medicines may prevent your body from absorbing iron from your food. NSAIDs such as ibuprofen or aspirin may cause bleeding in your intestines.  What are the signs and symptoms of FREDDY?    Feeling weak, tired, or irritable    Pale skin, cold hands, and feet    Headache, dizziness    Shortness of breath with activity or chest pain    Fast or uneven heartbeat    Sore or swollen tongue and mouth    Nails that break easily    An urge to eat things that are not food such as ice, paint, starch, or dirt  How is FREDDY diagnosed?    Blood tests will show how much iron is in your blood and how your body uses the iron.    A bowel movement sample will show any blood in your bowel movement.    An endoscopy may show bleeding in your esophagus or stomach. An endoscope is a bendable tube with a light and camera on the end. It is put into your esophagus through your mouth and throat.  Upper Endoscopy      A colonoscopy may show bleeding in your intestines. A scope is put into your rectum.    How is FREDDY treated? Treatment depends on the cause of your FREDDY and may take 3 to 6 months. You may need any of the following:    Medicines and supplements may increase the amount of iron in your blood. Ask your healthcare provider how much iron you should take each day. Tell your provider about all medicines you currently take.    Iron infusions replace iron in your body through an IV in one of your veins. Your provider will tell you if an iron infusion is right for you.    A blood transfusion may be needed if your anemia is severe. This will help replace the blood and iron you have lost.    Surgery may be needed if your FREDDY is caused by bleeding in your intestines.  How can I manage my symptoms?    Take iron supplements with vitamin C. Taking your iron supplement with food or drinks high in vitamin C helps your body absorb iron.  Sources of Vitamin C      Eat foods rich in iron and protein. Nuts, meat, dark leafy green vegetables, and beans are high in iron and protein. Limit milk to 2 cups a day. The calcium in milk can interfere with how your body absorbs iron. You may need to meet with a dietitian to create the right food plan for you.  Sources of Iron  Sources of Protein      Drink liquids as directed. Iron supplements may cause constipation. Liquids help prevent constipation. Ask how much liquid to drink each day and which liquids are best for you.  When should I seek immediate care?    You have dark or bloody bowel movements.    You vomit blood.    You are too dizzy to stand up.    You have trouble swallowing because of the pain in your mouth and throat.  When should I call my doctor?    You have heartburn, constipation, or diarrhea.    You have nausea or are vomiting.    You are dizzy or very tired.    You have questions or concerns about your condition or care.  CARE AGREEMENT:    You have the right to help plan your care. Learn about your health condition and how it may be treated. Discuss treatment options with your healthcare providers to decide what care you want to receive. You always have the right to refuse treatment.    © [employer] 1973, 2025    	  back to top            © [employer] 1973, 2025

## 2025-02-16 NOTE — ED STATDOCS - PROGRESS NOTE DETAILS
65 y/o F with PMH of HTN, DM, HLD, CAD, FREDDY presents with concern of Hgb 7.1. Pt was having labs assessed for planned colonoscopy. Reports transient dizziness. Denies CP, SOB, black/bloody stools, hematuria. PE: Well appearing. Cardiac: s1s2, RRR. Lungs: CTAB, abdomen: NBS x4 soft, nontender. A/P: Anemia, noted baseline Hgb 7-8. Will repeat labs. - Ayo Green PA-C hgb 7.3, will provide 1 unit PRBC. Remaining labs pending, expected dc after unit. Pt made aware, will place consent in paper chart. - Ayo Green Pa-C

## 2025-02-16 NOTE — ED STATDOCS - CLINICAL SUMMARY MEDICAL DECISION MAKING FREE TEXT BOX
CBC demonstrates microcytic anemia hemoglobin 7.3.  Baseline for patient appears to run between 7.8 and 8.1.  CMP unremarkable, including BUN within normal limits.  Exception of mild hyperglycemia, mild hypokalemia.  Patient mildly symptomatic, received 1 unit PRBC in the department without complication.  Feels well on reevaluation. Patient without history of any rectal bleeding, or dark stools.  Has a history of chronic microcytic anemia, has received iron infusions in the past.  Scheduled for colonoscopy.  Discharged home in good condition recommend close outpatient follow-up with her physicians.  Strict return precautions given for any worsening.  Patient verbalized understanding agree plan.

## 2025-02-16 NOTE — ED STATDOCS - OBJECTIVE STATEMENT
65 y/o female with PMHx of HTN, HLD, DM, CAD s/p 3 stents in 2024 presents to the ED SIB cardio Dr. Shivani Grijalva for pre-colonoscopy routine blood work showing hemoglobin of 7.1. Patient reports history of low hemoglobin and recent lightheadedness. Denies dark stools. No other complaints at this time.

## 2025-02-16 NOTE — ED STATDOCS - PATIENT PORTAL LINK FT
You can access the FollowMyHealth Patient Portal offered by [facility] by registering at the following website: [email or web address]. By joining [employer]’s FollowMyHealth portal, you will also be able to view your health information using other applications (apps) compatible with our system.

## 2025-02-16 NOTE — ED ADULT NURSE NOTE - CHIEF COMPLAINT QUOTE
Pt presents to ER c/o abnormal blood results. Pt reports she had blood work performed yesterday for pending colonoscopy and she got a call back today r/t hemoglobin of 7.1. Denies blood in stool. Pt reports she has felt intermittent lightheadedness over the past 2 weeks.